# Patient Record
Sex: MALE | Race: WHITE | Employment: PART TIME | URBAN - METROPOLITAN AREA
[De-identification: names, ages, dates, MRNs, and addresses within clinical notes are randomized per-mention and may not be internally consistent; named-entity substitution may affect disease eponyms.]

---

## 2021-08-06 ENCOUNTER — HOSPITAL ENCOUNTER (EMERGENCY)
Facility: CLINIC | Age: 21
Discharge: HOME | End: 2021-08-06
Attending: NURSE PRACTITIONER | Admitting: NURSE PRACTITIONER
Payer: COMMERCIAL

## 2021-08-06 VITALS
HEART RATE: 79 BPM | RESPIRATION RATE: 16 BRPM | WEIGHT: 149.25 LBS | OXYGEN SATURATION: 96 % | DIASTOLIC BLOOD PRESSURE: 73 MMHG | TEMPERATURE: 98.2 F | SYSTOLIC BLOOD PRESSURE: 115 MMHG

## 2021-08-06 DIAGNOSIS — L08.9 INFECTED SLIVER OF SKIN OF FINGER, INITIAL ENCOUNTER: ICD-10-CM

## 2021-08-06 DIAGNOSIS — S60.459A INFECTED SLIVER OF SKIN OF FINGER, INITIAL ENCOUNTER: ICD-10-CM

## 2021-08-06 PROCEDURE — 99203 OFFICE O/P NEW LOW 30 MIN: CPT | Mod: 25 | Performed by: NURSE PRACTITIONER

## 2021-08-06 PROCEDURE — 10120 INC&RMVL FB SUBQ TISS SMPL: CPT | Performed by: NURSE PRACTITIONER

## 2021-08-06 PROCEDURE — G0463 HOSPITAL OUTPT CLINIC VISIT: HCPCS | Mod: 25 | Performed by: NURSE PRACTITIONER

## 2021-08-06 RX ORDER — CEPHALEXIN 500 MG/1
500 CAPSULE ORAL 4 TIMES DAILY
Qty: 40 CAPSULE | Refills: 0 | Status: SHIPPED | OUTPATIENT
Start: 2021-08-06 | End: 2021-08-16

## 2021-08-07 NOTE — DISCHARGE INSTRUCTIONS
Soak the finger twice daily in Epsom salts for 5 to 10 minutes for approximately 5 to 10 days.  Apply a Band-Aid to cover the wound as long as the wound is draining.  When the wound dries out and stops draining you no longer would need to use a Band-Aid.  Start Keflex and take this 4 times daily for 10 days.  Follow-up with your primary care provider in 3 to 5 days if there is no improvement.

## 2021-08-07 NOTE — ED PROVIDER NOTES
History   No chief complaint on file.    HPI  Sd Obrien is a 21 year old male who presents with concerns of infected sliver in right thumb.  Pt reports sustained a wood sliver injury to right volar aspect of thumb.  Pt reports he was only able to remove part of the sliver and there is a portion remaining.  Pt states currently he is experiencing pain, swelling, redness and there is yellow white pustular drainage noted.  Onset of these symptoms were today.    Pt reports blood sugars have been stable and his AIC has been high.    Patient denies fever, cough, sore throat, nasal congestion/runny nose, vomiting, diarrhea, chest congestion, wheezing, myalgias, decreased appetite, decreased activity and hematuria, bright red rectal bleeding,  thoughts of harming self.    Allergies:  No Known Allergies    Problem List:    Patient Active Problem List    Diagnosis Date Noted     Insulin pump status 03/02/2016     Priority: Medium     Formatting of this note might be different from the original.  Basal insulin replacement for pump failure, 19.5 units (3/19/2021)       Type 1 diabetes mellitus without complication (H) 01/11/2016     Priority: Medium        Past Medical History:    No past medical history on file.    Past Surgical History:    No past surgical history on file.    Family History:    No family history on file.    Social History:  Marital Status:  Single [1]  Social History     Tobacco Use     Smoking status: Passive Smoke Exposure - Never Smoker     Tobacco comment: Quit smoking: dad smokes outside   Substance Use Topics     Alcohol use: Not on file     Drug use: Not on file        Medications:    blood glucose (NO BRAND SPECIFIED) test strip  cephALEXin (KEFLEX) 500 MG capsule  insulin lispro (HUMALOG) 100 UNIT/ML vial  CONTOUR NEXT TEST test strip      Review of Systems  As mentioned above in the history present illness. All other systems were reviewed and are negative.    Physical Exam   BP: 115/73  Pulse:  79  Temp: 98.2  F (36.8  C)  Resp: 16  Weight: 67.7 kg (149 lb 4 oz)  SpO2: 96 %      Physical Exam  Vitals and nursing note reviewed.   Constitutional:       General: He is not in acute distress.     Appearance: He is well-developed. He is not toxic-appearing or diaphoretic.   HENT:      Head: Normocephalic and atraumatic.      Right Ear: External ear normal.      Left Ear: External ear normal.      Nose: Nose normal.   Eyes:      General:         Right eye: No discharge.         Left eye: No discharge.      Conjunctiva/sclera: Conjunctivae normal.   Cardiovascular:      Rate and Rhythm: Normal rate and regular rhythm.      Heart sounds: Normal heart sounds. No murmur heard.   No friction rub. No gallop.    Pulmonary:      Effort: Pulmonary effort is normal.      Breath sounds: Normal breath sounds. No stridor. No wheezing.   Musculoskeletal:      Right hand: Swelling and tenderness present. No deformity or bony tenderness. Normal range of motion. Normal strength. Normal sensation. There is no disruption of two-point discrimination. Normal capillary refill. Normal pulse.   Skin:     General: Skin is warm.      Findings: No rash.   Neurological:      Mental Status: He is alert and oriented to person, place, and time.         ED [facility]    Foreign Body Removal    Date/Time: 8/6/2021 7:00 PM  Performed by: Yareli Levine APRN CNP  Authorized by: Yareli Levine APRN CNP       LOCATION     Location:  Finger    Finger location:  R thumb    Depth:  Intradermal    Tendon involvement:  None      PRE-PROCEDURE DETAILS     Imaging:  None    Neurovascular status: intact    ANESTHESIA (see MAR for exact dosages)     Anesthesia method:  Local infiltration and nerve block    Local anesthetic:  Lidocaine 1% w/o epi    Block location:  Right thumb    Block needle gauge:  27 G    Block technique:  Digital    Block injection procedure:  Anatomic landmarks identified, introduced  needle, negative aspiration for blood and incremental injection    Block outcome:  Anesthesia achieved      PROCEDURE TYPE     Procedure complexity:  Simple      PROCEDURE DETAILS     Scalpel size:  11    Incision length:  3 mm    Localization method:  Visualized    Dissection of underlying tissues: no      Bloodless field: no      Removal mechanism:  Forceps and irrigation    Foreign bodies recovered:  2    Description:  Black sliver    Intact foreign body removal: yes      POST-PROCEDURE DETAILS     Neurovascular status: intact      Confirmation:  No additional foreign bodies on visualization    Skin closure:  None    Dressing:  Antibiotic ointment    Patient tolerance of procedure:  Patient tolerated the procedure well with no immediate complications      PROCEDURE   Patient Tolerance:  Patient tolerated the procedure well with no immediate complications          No results found for this or any previous visit (from the past 24 hour(s)).    Medications - No data to display    Assessments & Plan (with Medical Decision Making)  Sd Obrien is a 21 year old male who presents with concerns of infected sliver in right thumb.  Patient reports sustaining sliver 2 to 3 days ago and attempted to remove the sliver by himself but was has been unable.  Patient reports over the last 24 hours increasing pain, redness, and swelling of the finger without purulent drainage.  Patient is type I diabetic and does admit to high hemoglobin A1c is but reports his blood sugars have been stable presently.  On exam patient has an erythematous right thumb with a wound/scab that is sealed over.  Incision and drainage completed and a sliver was removed without complication.  With concern of infection patient placed on Keflex 500 mg p.o. 4 times daily.  Encourage patient to soak in Epsom salts twice daily and encourage follow-up for a wound check and discussed worrisome reasons to return.  Patient discharged in stable condition.     I have  reviewed the nursing notes.    I have reviewed the findings, diagnosis, plan and need for follow up with the patient.    Discharge Medication List as of 8/6/2021  8:14 PM      START taking these medications    Details   cephALEXin (KEFLEX) 500 MG capsule Take 1 capsule (500 mg) by mouth 4 times daily for 10 days, Disp-40 capsule, R-0, E-Prescribe             Final diagnoses:   Infected sliver of skin of finger, initial encounter - right thumb       8/6/2021   [facility] EMERGENCY DEPT     Yareli Levine, ALLISON CNP  08/06/21 2051

## 2022-01-15 ENCOUNTER — APPOINTMENT (OUTPATIENT)
Dept: GENERAL RADIOLOGY | Facility: OTHER | Age: 22
DRG: 638 | End: 2022-01-15
Attending: STUDENT IN AN ORGANIZED HEALTH CARE EDUCATION/TRAINING PROGRAM
Payer: COMMERCIAL

## 2022-01-15 ENCOUNTER — HOSPITAL ENCOUNTER (INPATIENT)
Facility: OTHER | Age: 22
LOS: 1 days | Discharge: HOME | DRG: 638 | End: 2022-01-17
Attending: STUDENT IN AN ORGANIZED HEALTH CARE EDUCATION/TRAINING PROGRAM | Admitting: FAMILY MEDICINE
Payer: COMMERCIAL

## 2022-01-15 DIAGNOSIS — Z11.52 ENCOUNTER FOR SCREENING LABORATORY TESTING FOR COVID-19 VIRUS: ICD-10-CM

## 2022-01-15 DIAGNOSIS — E10.10 DIABETIC KETOACIDOSIS WITHOUT COMA ASSOCIATED WITH TYPE 1 DIABETES MELLITUS (H): ICD-10-CM

## 2022-01-15 DIAGNOSIS — E10.9 TYPE 1 DIABETES MELLITUS WITHOUT COMPLICATION (H): Primary | ICD-10-CM

## 2022-01-15 DIAGNOSIS — Z96.41 INSULIN PUMP STATUS: ICD-10-CM

## 2022-01-15 LAB
ALBUMIN UR-MCNC: 50 MG/DL
ANION GAP SERPL CALCULATED.3IONS-SCNC: 25 MMOL/L (ref 3–14)
ANION GAP SERPL CALCULATED.3IONS-SCNC: 29 MMOL/L (ref 3–14)
APPEARANCE UR: CLEAR
B-OH-BUTYR SERPL-MCNC: 6.1 MMOL/L (ref 0–0.6)
BASE EXCESS BLDA CALC-SCNC: -23.6 MMOL/L (ref -9–1.8)
BASE EXCESS BLDA CALC-SCNC: -24.9 MMOL/L (ref -9–1.8)
BASOPHILS # BLD AUTO: 0.1 10E3/UL (ref 0–0.2)
BASOPHILS # BLD AUTO: 0.1 10E3/UL (ref 0–0.2)
BASOPHILS NFR BLD AUTO: 0 %
BASOPHILS NFR BLD AUTO: 1 %
BILIRUB UR QL STRIP: NEGATIVE
BUN SERPL-MCNC: 24 MG/DL (ref 7–25)
BUN SERPL-MCNC: 26 MG/DL (ref 7–25)
CALCIUM SERPL-MCNC: 10.2 MG/DL (ref 8.6–10.3)
CALCIUM SERPL-MCNC: 9.3 MG/DL (ref 8.6–10.3)
CHLORIDE BLD-SCNC: 104 MMOL/L (ref 98–107)
CHLORIDE BLD-SCNC: 93 MMOL/L (ref 98–107)
CO2 SERPL-SCNC: 6 MMOL/L (ref 21–31)
CO2 SERPL-SCNC: 6 MMOL/L (ref 21–31)
COLOR UR AUTO: YELLOW
CREAT SERPL-MCNC: 1.14 MG/DL (ref 0.7–1.3)
CREAT SERPL-MCNC: 1.39 MG/DL (ref 0.7–1.3)
EOSINOPHIL # BLD AUTO: 0 10E3/UL (ref 0–0.7)
EOSINOPHIL # BLD AUTO: 0 10E3/UL (ref 0–0.7)
EOSINOPHIL NFR BLD AUTO: 0 %
EOSINOPHIL NFR BLD AUTO: 0 %
ERYTHROCYTE [DISTWIDTH] IN BLOOD BY AUTOMATED COUNT: 11.4 % (ref 10–15)
ERYTHROCYTE [DISTWIDTH] IN BLOOD BY AUTOMATED COUNT: 11.5 % (ref 10–15)
FLUAV RNA SPEC QL NAA+PROBE: NEGATIVE
FLUBV RNA RESP QL NAA+PROBE: NEGATIVE
GFR SERPL CREATININE-BSD FRML MDRD: 74 ML/MIN/1.73M2
GFR SERPL CREATININE-BSD FRML MDRD: >90 ML/MIN/1.73M2
GLUCOSE BLD-MCNC: 273 MG/DL (ref 70–105)
GLUCOSE BLD-MCNC: 416 MG/DL (ref 70–105)
GLUCOSE UR STRIP-MCNC: >1000 MG/DL
HBA1C MFR BLD: 11.8 % (ref 4–6.2)
HCO3 BLD-SCNC: 4 MMOL/L (ref 21–28)
HCO3 BLD-SCNC: 5 MMOL/L (ref 21–28)
HCT VFR BLD AUTO: 44.9 % (ref 40–53)
HCT VFR BLD AUTO: 49.2 % (ref 40–53)
HGB BLD-MCNC: 15 G/DL (ref 13.3–17.7)
HGB BLD-MCNC: 16.5 G/DL (ref 13.3–17.7)
HGB UR QL STRIP: ABNORMAL
IMM GRANULOCYTES # BLD: 0.3 10E3/UL
IMM GRANULOCYTES # BLD: 0.4 10E3/UL
IMM GRANULOCYTES NFR BLD: 1 %
IMM GRANULOCYTES NFR BLD: 1 %
KETONES UR STRIP-MCNC: >150 MG/DL
LACTATE SERPL-SCNC: 2.6 MMOL/L (ref 0.7–2)
LEUKOCYTE ESTERASE UR QL STRIP: NEGATIVE
LYMPHOCYTES # BLD AUTO: 1.7 10E3/UL (ref 0.8–5.3)
LYMPHOCYTES # BLD AUTO: 2.3 10E3/UL (ref 0.8–5.3)
LYMPHOCYTES NFR BLD AUTO: 6 %
LYMPHOCYTES NFR BLD AUTO: 9 %
MAGNESIUM SERPL-MCNC: 2.2 MG/DL (ref 1.9–2.7)
MCH RBC QN AUTO: 31.1 PG (ref 26.5–33)
MCH RBC QN AUTO: 31.1 PG (ref 26.5–33)
MCHC RBC AUTO-ENTMCNC: 33.4 G/DL (ref 31.5–36.5)
MCHC RBC AUTO-ENTMCNC: 33.5 G/DL (ref 31.5–36.5)
MCV RBC AUTO: 93 FL (ref 78–100)
MCV RBC AUTO: 93 FL (ref 78–100)
MONOCYTES # BLD AUTO: 1.5 10E3/UL (ref 0–1.3)
MONOCYTES # BLD AUTO: 1.7 10E3/UL (ref 0–1.3)
MONOCYTES NFR BLD AUTO: 6 %
MONOCYTES NFR BLD AUTO: 7 %
NEUTROPHILS # BLD AUTO: 21.6 10E3/UL (ref 1.6–8.3)
NEUTROPHILS # BLD AUTO: 23.6 10E3/UL (ref 1.6–8.3)
NEUTROPHILS NFR BLD AUTO: 83 %
NEUTROPHILS NFR BLD AUTO: 86 %
NITRATE UR QL: NEGATIVE
NRBC # BLD AUTO: 0 10E3/UL
NRBC # BLD AUTO: 0 10E3/UL
NRBC BLD AUTO-RTO: 0 /100
NRBC BLD AUTO-RTO: 0 /100
O2/TOTAL GAS SETTING VFR VENT: 0 %
O2/TOTAL GAS SETTING VFR VENT: 0 %
OXYHGB MFR BLD: 97 % (ref 92–100)
OXYHGB MFR BLD: 97 % (ref 92–100)
PCO2 BLD: 15 MM HG (ref 35–45)
PCO2 BLD: 16 MM HG (ref 35–45)
PH BLD: 7.03 [PH] (ref 7.35–7.45)
PH BLD: 7.07 [PH] (ref 7.35–7.45)
PH UR STRIP: 5 [PH] (ref 5–9)
PHOSPHATE SERPL-MCNC: 6.2 MG/DL (ref 2.5–5)
PLATELET # BLD AUTO: 306 10E3/UL (ref 150–450)
PLATELET # BLD AUTO: 400 10E3/UL (ref 150–450)
PO2 BLD: 116 MM HG (ref 80–105)
PO2 BLD: 118 MM HG (ref 80–105)
POTASSIUM BLD-SCNC: 5.3 MMOL/L (ref 3.5–5.1)
POTASSIUM BLD-SCNC: 7.1 MMOL/L (ref 3.5–5.1)
PROCALCITONIN SERPL-MCNC: <0.5 NG/ML
RBC # BLD AUTO: 4.83 10E6/UL (ref 4.4–5.9)
RBC # BLD AUTO: 5.31 10E6/UL (ref 4.4–5.9)
RBC URINE: <1 /HPF
RSV RNA SPEC NAA+PROBE: NEGATIVE
SARS-COV-2 RNA RESP QL NAA+PROBE: NEGATIVE
SODIUM SERPL-SCNC: 128 MMOL/L (ref 134–144)
SODIUM SERPL-SCNC: 135 MMOL/L (ref 134–144)
SP GR UR STRIP: 1.02 (ref 1–1.03)
UROBILINOGEN UR STRIP-MCNC: NORMAL MG/DL
WBC # BLD AUTO: 26 10E3/UL (ref 4–11)
WBC # BLD AUTO: 27.3 10E3/UL (ref 4–11)
WBC URINE: <1 /HPF

## 2022-01-15 PROCEDURE — 84100 ASSAY OF PHOSPHORUS: CPT | Performed by: STUDENT IN AN ORGANIZED HEALTH CARE EDUCATION/TRAINING PROGRAM

## 2022-01-15 PROCEDURE — 36416 COLLJ CAPILLARY BLOOD SPEC: CPT | Performed by: STUDENT IN AN ORGANIZED HEALTH CARE EDUCATION/TRAINING PROGRAM

## 2022-01-15 PROCEDURE — 258N000001 HC RX 258: Performed by: STUDENT IN AN ORGANIZED HEALTH CARE EDUCATION/TRAINING PROGRAM

## 2022-01-15 PROCEDURE — 93005 ELECTROCARDIOGRAM TRACING: CPT | Performed by: STUDENT IN AN ORGANIZED HEALTH CARE EDUCATION/TRAINING PROGRAM

## 2022-01-15 PROCEDURE — 250N000012 HC RX MED GY IP 250 OP 636 PS 637: Performed by: STUDENT IN AN ORGANIZED HEALTH CARE EDUCATION/TRAINING PROGRAM

## 2022-01-15 PROCEDURE — 96366 THER/PROPH/DIAG IV INF ADDON: CPT | Performed by: STUDENT IN AN ORGANIZED HEALTH CARE EDUCATION/TRAINING PROGRAM

## 2022-01-15 PROCEDURE — 36600 WITHDRAWAL OF ARTERIAL BLOOD: CPT | Performed by: STUDENT IN AN ORGANIZED HEALTH CARE EDUCATION/TRAINING PROGRAM

## 2022-01-15 PROCEDURE — 82010 KETONE BODYS QUAN: CPT | Performed by: STUDENT IN AN ORGANIZED HEALTH CARE EDUCATION/TRAINING PROGRAM

## 2022-01-15 PROCEDURE — 99285 EMERGENCY DEPT VISIT HI MDM: CPT | Mod: 25 | Performed by: STUDENT IN AN ORGANIZED HEALTH CARE EDUCATION/TRAINING PROGRAM

## 2022-01-15 PROCEDURE — 83735 ASSAY OF MAGNESIUM: CPT | Performed by: STUDENT IN AN ORGANIZED HEALTH CARE EDUCATION/TRAINING PROGRAM

## 2022-01-15 PROCEDURE — 87637 SARSCOV2&INF A&B&RSV AMP PRB: CPT | Performed by: STUDENT IN AN ORGANIZED HEALTH CARE EDUCATION/TRAINING PROGRAM

## 2022-01-15 PROCEDURE — 96368 THER/DIAG CONCURRENT INF: CPT | Performed by: STUDENT IN AN ORGANIZED HEALTH CARE EDUCATION/TRAINING PROGRAM

## 2022-01-15 PROCEDURE — 99285 EMERGENCY DEPT VISIT HI MDM: CPT | Performed by: STUDENT IN AN ORGANIZED HEALTH CARE EDUCATION/TRAINING PROGRAM

## 2022-01-15 PROCEDURE — 84145 PROCALCITONIN (PCT): CPT | Performed by: STUDENT IN AN ORGANIZED HEALTH CARE EDUCATION/TRAINING PROGRAM

## 2022-01-15 PROCEDURE — 83036 HEMOGLOBIN GLYCOSYLATED A1C: CPT | Performed by: STUDENT IN AN ORGANIZED HEALTH CARE EDUCATION/TRAINING PROGRAM

## 2022-01-15 PROCEDURE — 93010 ELECTROCARDIOGRAM REPORT: CPT | Performed by: INTERNAL MEDICINE

## 2022-01-15 PROCEDURE — 250N000011 HC RX IP 250 OP 636: Performed by: STUDENT IN AN ORGANIZED HEALTH CARE EDUCATION/TRAINING PROGRAM

## 2022-01-15 PROCEDURE — 80048 BASIC METABOLIC PNL TOTAL CA: CPT | Performed by: STUDENT IN AN ORGANIZED HEALTH CARE EDUCATION/TRAINING PROGRAM

## 2022-01-15 PROCEDURE — 83605 ASSAY OF LACTIC ACID: CPT | Performed by: STUDENT IN AN ORGANIZED HEALTH CARE EDUCATION/TRAINING PROGRAM

## 2022-01-15 PROCEDURE — 36415 COLL VENOUS BLD VENIPUNCTURE: CPT | Performed by: STUDENT IN AN ORGANIZED HEALTH CARE EDUCATION/TRAINING PROGRAM

## 2022-01-15 PROCEDURE — 81001 URINALYSIS AUTO W/SCOPE: CPT | Performed by: STUDENT IN AN ORGANIZED HEALTH CARE EDUCATION/TRAINING PROGRAM

## 2022-01-15 PROCEDURE — C9803 HOPD COVID-19 SPEC COLLECT: HCPCS | Performed by: STUDENT IN AN ORGANIZED HEALTH CARE EDUCATION/TRAINING PROGRAM

## 2022-01-15 PROCEDURE — 82805 BLOOD GASES W/O2 SATURATION: CPT | Performed by: STUDENT IN AN ORGANIZED HEALTH CARE EDUCATION/TRAINING PROGRAM

## 2022-01-15 PROCEDURE — 96375 TX/PRO/DX INJ NEW DRUG ADDON: CPT | Performed by: STUDENT IN AN ORGANIZED HEALTH CARE EDUCATION/TRAINING PROGRAM

## 2022-01-15 PROCEDURE — 85025 COMPLETE CBC W/AUTO DIFF WBC: CPT | Performed by: STUDENT IN AN ORGANIZED HEALTH CARE EDUCATION/TRAINING PROGRAM

## 2022-01-15 PROCEDURE — 258N000003 HC RX IP 258 OP 636: Performed by: STUDENT IN AN ORGANIZED HEALTH CARE EDUCATION/TRAINING PROGRAM

## 2022-01-15 PROCEDURE — 96365 THER/PROPH/DIAG IV INF INIT: CPT | Performed by: STUDENT IN AN ORGANIZED HEALTH CARE EDUCATION/TRAINING PROGRAM

## 2022-01-15 PROCEDURE — 71046 X-RAY EXAM CHEST 2 VIEWS: CPT | Mod: TC

## 2022-01-15 PROCEDURE — 250N000009 HC RX 250: Performed by: STUDENT IN AN ORGANIZED HEALTH CARE EDUCATION/TRAINING PROGRAM

## 2022-01-15 RX ORDER — DEXTROSE MONOHYDRATE 25 G/50ML
25-50 INJECTION, SOLUTION INTRAVENOUS
Status: DISCONTINUED | OUTPATIENT
Start: 2022-01-15 | End: 2022-01-16

## 2022-01-15 RX ORDER — CALCIUM GLUCONATE 94 MG/ML
1 INJECTION, SOLUTION INTRAVENOUS ONCE
Status: COMPLETED | OUTPATIENT
Start: 2022-01-15 | End: 2022-01-15

## 2022-01-15 RX ORDER — KETOROLAC TROMETHAMINE 15 MG/ML
15 INJECTION, SOLUTION INTRAMUSCULAR; INTRAVENOUS ONCE
Status: COMPLETED | OUTPATIENT
Start: 2022-01-15 | End: 2022-01-15

## 2022-01-15 RX ORDER — ONDANSETRON 2 MG/ML
4 INJECTION INTRAMUSCULAR; INTRAVENOUS EVERY 30 MIN PRN
Status: DISCONTINUED | OUTPATIENT
Start: 2022-01-15 | End: 2022-01-16

## 2022-01-15 RX ADMIN — INSULIN HUMAN 7 UNITS: 100 INJECTION, SOLUTION PARENTERAL at 21:50

## 2022-01-15 RX ADMIN — Medication 5 UNITS/HR: at 21:42

## 2022-01-15 RX ADMIN — SODIUM CHLORIDE 1000 ML: 9 INJECTION, SOLUTION INTRAVENOUS at 20:17

## 2022-01-15 RX ADMIN — KETOROLAC TROMETHAMINE 15 MG: 15 INJECTION, SOLUTION INTRAMUSCULAR; INTRAVENOUS at 20:26

## 2022-01-15 RX ADMIN — ONDANSETRON 4 MG: 2 INJECTION INTRAMUSCULAR; INTRAVENOUS at 20:18

## 2022-01-15 RX ADMIN — SODIUM CHLORIDE 1000 ML: 9 INJECTION, SOLUTION INTRAVENOUS at 21:17

## 2022-01-15 RX ADMIN — CALCIUM GLUCONATE 1 G: 98 INJECTION, SOLUTION INTRAVENOUS at 21:29

## 2022-01-15 RX ADMIN — DEXTROSE MONOHYDRATE AND SODIUM CHLORIDE 1000 ML: 5; .45 INJECTION, SOLUTION INTRAVENOUS at 23:08

## 2022-01-15 ASSESSMENT — MIFFLIN-ST. JEOR: SCORE: 1662.25

## 2022-01-16 PROBLEM — E10.10 DIABETIC KETOACIDOSIS WITHOUT COMA ASSOCIATED WITH TYPE 1 DIABETES MELLITUS (H): Status: ACTIVE | Noted: 2022-01-16

## 2022-01-16 PROBLEM — R65.10 SIRS (SYSTEMIC INFLAMMATORY RESPONSE SYNDROME) (H): Status: ACTIVE | Noted: 2022-01-16

## 2022-01-16 PROBLEM — E87.1 HYPONATREMIA: Status: ACTIVE | Noted: 2022-01-16

## 2022-01-16 LAB
ANION GAP SERPL CALCULATED.3IONS-SCNC: 10 MMOL/L (ref 3–14)
ANION GAP SERPL CALCULATED.3IONS-SCNC: 14 MMOL/L (ref 3–14)
ANION GAP SERPL CALCULATED.3IONS-SCNC: 19 MMOL/L (ref 3–14)
ATRIAL RATE - MUSE: 94 BPM
B-OH-BUTYR SERPL-MCNC: 0.7 MMOL/L (ref 0–0.6)
BASE EXCESS BLDV CALC-SCNC: -13.5 MMOL/L (ref -7.7–1.9)
BASE EXCESS BLDV CALC-SCNC: -19.2 MMOL/L (ref -7.7–1.9)
BASE EXCESS BLDV CALC-SCNC: -7.2 MMOL/L (ref -7.7–1.9)
BASOPHILS # BLD AUTO: 0 10E3/UL (ref 0–0.2)
BASOPHILS NFR BLD AUTO: 0 %
BUN SERPL-MCNC: 17 MG/DL (ref 7–25)
BUN SERPL-MCNC: 17 MG/DL (ref 7–25)
BUN SERPL-MCNC: 20 MG/DL (ref 7–25)
CALCIUM SERPL-MCNC: 8.9 MG/DL (ref 8.6–10.3)
CALCIUM SERPL-MCNC: 9 MG/DL (ref 8.6–10.3)
CALCIUM SERPL-MCNC: 9.1 MG/DL (ref 8.6–10.3)
CHLORIDE BLD-SCNC: 102 MMOL/L (ref 98–107)
CHLORIDE BLD-SCNC: 103 MMOL/L (ref 98–107)
CHLORIDE BLD-SCNC: 103 MMOL/L (ref 98–107)
CO2 SERPL-SCNC: 12 MMOL/L (ref 21–31)
CO2 SERPL-SCNC: 16 MMOL/L (ref 21–31)
CO2 SERPL-SCNC: 8 MMOL/L (ref 21–31)
CREAT SERPL-MCNC: 1.04 MG/DL (ref 0.7–1.3)
CREAT SERPL-MCNC: 1.05 MG/DL (ref 0.7–1.3)
CREAT SERPL-MCNC: 1.28 MG/DL (ref 0.7–1.3)
DIASTOLIC BLOOD PRESSURE - MUSE: NORMAL MMHG
EOSINOPHIL # BLD AUTO: 0 10E3/UL (ref 0–0.7)
EOSINOPHIL NFR BLD AUTO: 0 %
ERYTHROCYTE [DISTWIDTH] IN BLOOD BY AUTOMATED COUNT: 11.4 % (ref 10–15)
GFR SERPL CREATININE-BSD FRML MDRD: 82 ML/MIN/1.73M2
GFR SERPL CREATININE-BSD FRML MDRD: >90 ML/MIN/1.73M2
GFR SERPL CREATININE-BSD FRML MDRD: >90 ML/MIN/1.73M2
GLUCOSE BLD-MCNC: 223 MG/DL (ref 70–105)
GLUCOSE BLD-MCNC: 242 MG/DL (ref 70–105)
GLUCOSE BLD-MCNC: 320 MG/DL (ref 70–105)
HCO3 BLDV-SCNC: 12 MMOL/L (ref 21–28)
HCO3 BLDV-SCNC: 17 MMOL/L (ref 21–28)
HCO3 BLDV-SCNC: 8 MMOL/L (ref 21–28)
HCT VFR BLD AUTO: 40 % (ref 40–53)
HGB BLD-MCNC: 13.8 G/DL (ref 13.3–17.7)
IMM GRANULOCYTES # BLD: 0.2 10E3/UL
IMM GRANULOCYTES NFR BLD: 1 %
INTERPRETATION ECG - MUSE: NORMAL
LACTATE SERPL-SCNC: 1 MMOL/L (ref 0.7–2)
LYMPHOCYTES # BLD AUTO: 2.1 10E3/UL (ref 0.8–5.3)
LYMPHOCYTES NFR BLD AUTO: 11 %
MCH RBC QN AUTO: 31.2 PG (ref 26.5–33)
MCHC RBC AUTO-ENTMCNC: 34.5 G/DL (ref 31.5–36.5)
MCV RBC AUTO: 91 FL (ref 78–100)
MONOCYTES # BLD AUTO: 1.5 10E3/UL (ref 0–1.3)
MONOCYTES NFR BLD AUTO: 8 %
NEUTROPHILS # BLD AUTO: 15 10E3/UL (ref 1.6–8.3)
NEUTROPHILS NFR BLD AUTO: 80 %
NRBC # BLD AUTO: 0 10E3/UL
NRBC BLD AUTO-RTO: 0 /100
O2/TOTAL GAS SETTING VFR VENT: 0 %
OXYHGB MFR BLDV: 95 % (ref 70–75)
OXYHGB MFR BLDV: 95 % (ref 70–75)
OXYHGB MFR BLDV: 97 % (ref 70–75)
P AXIS - MUSE: 67 DEGREES
PCO2 BLDV: 21 MM HG (ref 40–50)
PCO2 BLDV: 27 MM HG (ref 40–50)
PCO2 BLDV: 31 MM HG (ref 40–50)
PH BLDV: 7.16 [PH] (ref 7.32–7.43)
PH BLDV: 7.25 [PH] (ref 7.32–7.43)
PH BLDV: 7.35 [PH] (ref 7.32–7.43)
PLATELET # BLD AUTO: 323 10E3/UL (ref 150–450)
PO2 BLDV: 108 MM HG (ref 25–47)
PO2 BLDV: 80 MM HG (ref 25–47)
PO2 BLDV: 84 MM HG (ref 25–47)
POTASSIUM BLD-SCNC: 4.1 MMOL/L (ref 3.5–5.1)
POTASSIUM BLD-SCNC: 4.5 MMOL/L (ref 3.5–5.1)
POTASSIUM BLD-SCNC: 5.2 MMOL/L (ref 3.5–5.1)
PR INTERVAL - MUSE: 166 MS
QRS DURATION - MUSE: 86 MS
QT - MUSE: 358 MS
QTC - MUSE: 447 MS
R AXIS - MUSE: 80 DEGREES
RBC # BLD AUTO: 4.42 10E6/UL (ref 4.4–5.9)
SODIUM SERPL-SCNC: 128 MMOL/L (ref 134–144)
SODIUM SERPL-SCNC: 129 MMOL/L (ref 134–144)
SODIUM SERPL-SCNC: 130 MMOL/L (ref 134–144)
SYSTOLIC BLOOD PRESSURE - MUSE: NORMAL MMHG
T AXIS - MUSE: 59 DEGREES
VENTRICULAR RATE- MUSE: 94 BPM
WBC # BLD AUTO: 18.7 10E3/UL (ref 4–11)

## 2022-01-16 PROCEDURE — 258N000003 HC RX IP 258 OP 636: Performed by: FAMILY MEDICINE

## 2022-01-16 PROCEDURE — 82374 ASSAY BLOOD CARBON DIOXIDE: CPT | Performed by: STUDENT IN AN ORGANIZED HEALTH CARE EDUCATION/TRAINING PROGRAM

## 2022-01-16 PROCEDURE — 99222 1ST HOSP IP/OBS MODERATE 55: CPT | Performed by: FAMILY MEDICINE

## 2022-01-16 PROCEDURE — 82310 ASSAY OF CALCIUM: CPT | Performed by: STUDENT IN AN ORGANIZED HEALTH CARE EDUCATION/TRAINING PROGRAM

## 2022-01-16 PROCEDURE — 96372 THER/PROPH/DIAG INJ SC/IM: CPT | Performed by: STUDENT IN AN ORGANIZED HEALTH CARE EDUCATION/TRAINING PROGRAM

## 2022-01-16 PROCEDURE — 82805 BLOOD GASES W/O2 SATURATION: CPT | Performed by: STUDENT IN AN ORGANIZED HEALTH CARE EDUCATION/TRAINING PROGRAM

## 2022-01-16 PROCEDURE — 85025 COMPLETE CBC W/AUTO DIFF WBC: CPT | Performed by: STUDENT IN AN ORGANIZED HEALTH CARE EDUCATION/TRAINING PROGRAM

## 2022-01-16 PROCEDURE — 258N000001 HC RX 258: Performed by: STUDENT IN AN ORGANIZED HEALTH CARE EDUCATION/TRAINING PROGRAM

## 2022-01-16 PROCEDURE — 82010 KETONE BODYS QUAN: CPT | Performed by: FAMILY MEDICINE

## 2022-01-16 PROCEDURE — 83605 ASSAY OF LACTIC ACID: CPT | Performed by: FAMILY MEDICINE

## 2022-01-16 PROCEDURE — 120N000001 HC R&B MED SURG/OB

## 2022-01-16 PROCEDURE — 36415 COLL VENOUS BLD VENIPUNCTURE: CPT | Performed by: STUDENT IN AN ORGANIZED HEALTH CARE EDUCATION/TRAINING PROGRAM

## 2022-01-16 PROCEDURE — 36415 COLL VENOUS BLD VENIPUNCTURE: CPT | Performed by: FAMILY MEDICINE

## 2022-01-16 PROCEDURE — 250N000012 HC RX MED GY IP 250 OP 636 PS 637: Performed by: STUDENT IN AN ORGANIZED HEALTH CARE EDUCATION/TRAINING PROGRAM

## 2022-01-16 PROCEDURE — 250N000012 HC RX MED GY IP 250 OP 636 PS 637: Performed by: FAMILY MEDICINE

## 2022-01-16 RX ORDER — ACETAMINOPHEN 650 MG/1
650 SUPPOSITORY RECTAL EVERY 6 HOURS PRN
Status: DISCONTINUED | OUTPATIENT
Start: 2022-01-16 | End: 2022-01-17 | Stop reason: HOSPADM

## 2022-01-16 RX ORDER — ONDANSETRON 4 MG/1
4 TABLET, ORALLY DISINTEGRATING ORAL EVERY 6 HOURS PRN
Status: DISCONTINUED | OUTPATIENT
Start: 2022-01-16 | End: 2022-01-17 | Stop reason: HOSPADM

## 2022-01-16 RX ORDER — NICOTINE POLACRILEX 4 MG
15-30 LOZENGE BUCCAL
Status: DISCONTINUED | OUTPATIENT
Start: 2022-01-16 | End: 2022-01-17 | Stop reason: HOSPADM

## 2022-01-16 RX ORDER — DEXTROSE MONOHYDRATE 25 G/50ML
25-50 INJECTION, SOLUTION INTRAVENOUS
Status: DISCONTINUED | OUTPATIENT
Start: 2022-01-16 | End: 2022-01-17 | Stop reason: HOSPADM

## 2022-01-16 RX ORDER — ACETAMINOPHEN 325 MG/1
650 TABLET ORAL EVERY 6 HOURS PRN
Status: DISCONTINUED | OUTPATIENT
Start: 2022-01-16 | End: 2022-01-17 | Stop reason: HOSPADM

## 2022-01-16 RX ORDER — SODIUM CHLORIDE 9 MG/ML
INJECTION, SOLUTION INTRAVENOUS CONTINUOUS
Status: DISCONTINUED | OUTPATIENT
Start: 2022-01-16 | End: 2022-01-17 | Stop reason: HOSPADM

## 2022-01-16 RX ORDER — LIDOCAINE 40 MG/G
CREAM TOPICAL
Status: DISCONTINUED | OUTPATIENT
Start: 2022-01-16 | End: 2022-01-17 | Stop reason: HOSPADM

## 2022-01-16 RX ORDER — ONDANSETRON 2 MG/ML
4 INJECTION INTRAMUSCULAR; INTRAVENOUS EVERY 6 HOURS PRN
Status: DISCONTINUED | OUTPATIENT
Start: 2022-01-16 | End: 2022-01-17 | Stop reason: HOSPADM

## 2022-01-16 RX ADMIN — SODIUM CHLORIDE: 9 INJECTION, SOLUTION INTRAVENOUS at 11:32

## 2022-01-16 RX ADMIN — DEXTROSE MONOHYDRATE AND SODIUM CHLORIDE: 5; .45 INJECTION, SOLUTION INTRAVENOUS at 07:49

## 2022-01-16 RX ADMIN — INSULIN GLARGINE 20 UNITS: 100 INJECTION, SOLUTION SUBCUTANEOUS at 08:31

## 2022-01-16 RX ADMIN — SODIUM CHLORIDE: 9 INJECTION, SOLUTION INTRAVENOUS at 20:54

## 2022-01-16 RX ADMIN — INSULIN ASPART 5 UNITS: 100 INJECTION, SOLUTION INTRAVENOUS; SUBCUTANEOUS at 12:05

## 2022-01-16 RX ADMIN — DEXTROSE MONOHYDRATE AND SODIUM CHLORIDE 1000 ML: 5; .45 INJECTION, SOLUTION INTRAVENOUS at 02:10

## 2022-01-16 RX ADMIN — INSULIN ASPART 3 UNITS: 100 INJECTION, SOLUTION INTRAVENOUS; SUBCUTANEOUS at 17:21

## 2022-01-16 ASSESSMENT — ACTIVITIES OF DAILY LIVING (ADL)
ADLS_ACUITY_SCORE: 6

## 2022-01-16 ASSESSMENT — MIFFLIN-ST. JEOR: SCORE: 1660.35

## 2022-01-16 NOTE — ASSESSMENT & PLAN NOTE
Likely secondary to his DKA  This a.m. slightly worsened with use of half-normal saline  This time will switch over to normal saline and recheck later today

## 2022-01-16 NOTE — H&P
[facility]    History and Physical - Hospitalist Service       Date of Admission:  1/15/2022    Assessment & Plan      Sd Obrien is a 21 year old male admitted on 1/15/2022. He presents from home to the ED not feeling well for the past day with malaise, poor appetite.  Patient is type I diabetic, normally controlled with use of insulin pump and Dexcom monitor although due to lack of supplies has been using an old [employer] pump without glucose monitoring.  On arrival to the ED his blood sugar was elevated greater than 400 with other chemical indicators showing him to be in DKA with a pH of 7.07, increased anion gap with other electrolyte abnormalities including hyponatremia, hyperkalemia and elevated lactate.  He denies fever but white count was markedly elevated and he met SIRS criteria, but no signs of obvious infectious etiology with procalcitonin being negative.  Due to lack of ICU space both here and at [facility] he has been treated in the emergency department with continuous insulin infusion and has improved overnight, this a.m. at baseline mental status, blood sugar stable with electrolytes improving.  Endocrinology has been consulted at [facility] with recommendation of transitioning from insulin infusion to subcu insulin recommending Lantus 20 units in the a.m. today with 1-12 carb coverage at meals and use of a low sliding scale coverage.  Patient will be admitted to the medical floor with these recommendations.  He has somewhat low sodium and will switch over to normal saline infusion and monitor electrolytes and numbers during the day.  Expect if doing well, can likely go home tomorrow and the plan is for him to  supplies to be able to restart his normal pump and Dexcom monitor.  Diabetic ketoacidosis without coma associated with type 1 diabetes mellitus (H)  21-year-old type I diabetic presenting with malaise, body aches the past day  Using insulin pump although using old  pump without glucose monitoring  On arrival, hyperglycemic with blood sugar force 61 with elevated ketones, elevated anion gap and a blood gas showing pH of 7.07.  Due to lack of ICU bed space and not able to transfer to [facility] where he is normally treated, he has been treated in the ED  Improving after insulin infusion with improvement of electrolytes and acid-base abnormalities  His case was reviewed by ER and endocrine with the recommendation this a.m. of transferring over to 20 units of a.m. Lantus with 1-12 carb coverage with meals and sliding scale coverage.  Patient will be admitted to inpatient on the medical floor, will continue these recommendations, IV fluids  Expect to be in the hospital for the least the next day with the plan for him to restart insulin pump at discharge after he gets the necessary supplies    SIRS (systemic inflammatory response syndrome) (H)  Presenting ill with malaise, no fever with tachycardia, leukocytosis with elevated lactate but normal procalcitonin.  SIRS criteria likely set off by DKA, no clear indication of infectious etiology  This a.m. clinically looks well, white count trending down  He will be admitted for treatment of DKA, hold antibiotic coverage at this point  Has been treated with aggressive IV fluids, eating and drinking normally now    Hyponatremia  Likely secondary to his DKA  This a.m. slightly worsened with use of half-normal saline  This time will switch over to normal saline and recheck later today         Diet:  Moderate carb diet  DVT Prophylaxis: Low Risk/Ambulatory with no VTE prophylaxis indicated  Mena Catheter: Not present  Central Lines: None  Code Status:  Full code    Clinically Significant Risk Factors Present on Admission         # Hyponatremia: Na = 128 mmol/L (Ref range: 134 - 144 mmol/L) on admission, will monitor as appropriate            Disposition Plan   Expected Discharge:  tomorrow  Anticipated discharge location:  Awaiting care  coordination huddle  Delays:  Control blood sugar, improved electrolytes       The patient's care was discussed with the Bedside Nurse, Patient and ER provider.    Mateus Krishnan MD  [facility]  Securely message with the Vocera Web Console (learn more here)  Text page via [facility] Paging/Directory        ______________________________________________________________________    Chief Complaint   21-year-old male with type 1 diabetes presenting with diabetic ketoacidosis    History is obtained from the patient, electronic health record and emergency department physician    History of Present Illness   Sd Obrien is a 21 year old male who presents from home not feeling well for the past 2 days with malaise, poor appetite, nausea.  Patient is type I diabetic who recently has been using an old insulin pump because his newer pump and Dexcom monitor had run out of supplies.  He has not been checking his sugars.  He has had poor appetite with nausea and is cut back on his dosing and did not take insulin on the morning of admission.  On arrival to the ED he was acidotic, hyperglycemic with multiple metabolic abnormalities.  Due to lack of bed space he has been treated in the ER overnight with continuous insulin infusion with his numbers correcting.  This morning he is feeling much better, tolerating diet and will be admitted to the medical floor.  At home he denies fever.  There is been no cough, shortness of breath.  He is COVID immunized with no recent exposure.  He denies diarrhea.    Review of Systems    All other systems reviewed and negative other than noted in the HPI or here.       Past Medical History    I have reviewed this patient's medical history and updated it with pertinent information if needed.   History reviewed. No pertinent past medical history.    Past Surgical History   I have reviewed this patient's surgical history and updated it with pertinent information if  needed.  History reviewed. No pertinent surgical history.    Social History   I have reviewed this patient's social history and updated it with pertinent information if needed.  Social History     Tobacco Use     Smoking status: Passive Smoke Exposure - Never Smoker     Smokeless tobacco: Never Used     Tobacco comment: Quit smoking: dad smokes outside   Substance Use Topics     Alcohol use: Never     Drug use: Never       Family History   No significant family history, including no history of:     Prior to Admission Medications   Prior to Admission Medications   Prescriptions Last Dose Informant Patient Reported? Taking?   CONTOUR NEXT TEST test strip   Yes No   Sig: TESTING 6 TIMES DAILY   blood glucose (NO BRAND SPECIFIED) test strip   Yes No   Si times a day, patient needs these to use with his insulin pump, cannot use another test strip, start PA if necessary.   insulin lispro (HUMALOG) 100 UNIT/ML vial   Yes No   Sig: For use in insulin pump; BR 12A 0.85, 6A 0.80, 11A 0.75,  4P 0.8 10P 0.90  CR: 12A 10 SF 35, TDD - 60 units      Facility-Administered Medications: None     Allergies   No Known Allergies    Physical Exam   Vital Signs: Temp: 97.2  F (36.2  C) Temp src: Tympanic BP: 95/50 Pulse: 95   Resp: 13 SpO2: 98 % O2 Device: None (Room air)    Weight: 143 lbs 8.31 oz    General Appearance: Appropriate age male, sitting in bed eating breakfast, appears to be in no distress  Eyes: Pupils equal, reactive  HEENT: Nose mouth throat unremarkable  Respiratory: Lungs are clear  Cardiovascular: Regular rate and rhythm no murmur heard  GI: Soft, nondistended, nontender  Lymph/Hematologic: Cervical nodes negative  Genitourinary: Not examined  Skin: No lesions or rashes  Musculoskeletal: No abnormalities  Neurologic: No focal changes, cranial nerves normal  Psychiatric: Mood and affect are normal    Data   Data reviewed today: I reviewed all medications, new labs and imaging results over the last 24 hours. I  personally reviewed the chest x-ray image(s) showing Normal, no signs of infiltrate.    Results for orders placed or performed during the hospital encounter of 01/15/22   XR Chest 2 Views     Status: None    Narrative    PROCEDURE INFORMATION:   Exam: XR Chest   Exam date and time: 1/15/2022 11:04 PM   Age: 21 years old   Clinical indication: Shortness of breath; Additional info: ? Occult pna with   wbc 27     TECHNIQUE:   Imaging protocol: XR of the chest.   Views: 2 views.     COMPARISON:   No relevant prior studies available.     FINDINGS:   Lungs: No consolidative infiltrate.   Pleural spaces: No pleural effusion or pneumothorax.   Heart/Mediastinum: Normal heart size.   Bones/joints: No acute finding.       Impression    IMPRESSION:   No radiographic signs of acute process.     THIS DOCUMENT HAS BEEN ELECTRONICALLY SIGNED BY CANDACE MONTOYA MD   Symptomatic; Yes; 1/14/2022 Influenza A/B & SARS-CoV2 (COVID-19) Virus PCR Multiplex Nose     Status: Normal    Specimen: Nose; Swab   Result Value Ref Range    Influenza A PCR Negative Negative    Influenza B PCR Negative Negative    RSV PCR Negative Negative    SARS CoV2 PCR Negative Negative    Narrative    Testing was performed using the Xpert Xpress CoV2/Flu/RSV Assay on the [employer] GeneXpert Instrument. This test should be ordered for the detection of SARS-CoV-2 and influenza viruses in individuals who meet clinical and/or epidemiological criteria. Test performance is unknown in asymptomatic patients. This test is for in vitro diagnostic use under the FDA EUA for laboratories certified under CLIA to perform high or moderate complexity testing. This test has not been FDA cleared or approved. A negative result does not rule out the presence of PCR inhibitors in the specimen or target RNA in concentration below the limit of detection for the assay. If only one viral target is positive but coinfection with multiple targets is suspected, the sample should be re-tested  with another FDA cleared, approved, or authorized test, if coinfection would change clinical management. This test was validated by the [facility] [employer] - is now [employer]. These laboratories are certified under the Clinical  Laboratory Improvement Amendments of 1988 (CLIA-88) as qualified to perform high complexity laboratory testing.   Hemoglobin A1c     Status: Abnormal   Result Value Ref Range    Hemoglobin A1C 11.8 (H) 4.0 - 6.2 %   Basic metabolic panel     Status: Abnormal   Result Value Ref Range    Sodium 128 (L) 134 - 144 mmol/L    Potassium 7.1 (HH) 3.5 - 5.1 mmol/L    Chloride 93 (L) 98 - 107 mmol/L    Carbon Dioxide (CO2) 6 (LL) 21 - 31 mmol/L    Anion Gap 29 (H) 3 - 14 mmol/L    Urea Nitrogen 26 (H) 7 - 25 mg/dL    Creatinine 1.39 (H) 0.70 - 1.30 mg/dL    Calcium 10.2 8.6 - 10.3 mg/dL    Glucose 416 (H) 70 - 105 mg/dL    GFR Estimate 74 >60 mL/min/1.73m2   Blood gas arterial and oxyhgb     Status: Abnormal   Result Value Ref Range    pH Arterial 7.07 (LL) 7.35 - 7.45    pCO2 Arterial 15 (LL) 35 - 45 mm Hg    pO2 Arterial 116 (H) 80 - 105 mm Hg    Bicarbonate Arterial 5 (LL) 21 - 28 mmol/L    Oxyhemoglobin Arterial 97 92 - 100 %    Base Excess/Deficit (+/-) -23.6 (L) -9.0 - 1.8 mmol/L    FIO2 0    Ketone Beta-Hydroxybutyrate Quantitative     Status: Abnormal   Result Value Ref Range    Ketone (Beta-Hydroxybutyrate) Quantitative 6.1 (HH) 0.0 - 0.6 mmol/L   CBC with platelets and differential     Status: Abnormal   Result Value Ref Range    WBC Count 27.3 (H) 4.0 - 11.0 10e3/uL    RBC Count 5.31 4.40 - 5.90 10e6/uL    Hemoglobin 16.5 13.3 - 17.7 g/dL    Hematocrit 49.2 40.0 - 53.0 %    MCV 93 78 - 100 fL    MCH 31.1 26.5 - 33.0 pg    MCHC 33.5 31.5 - 36.5 g/dL    RDW 11.5 10.0 - 15.0 %    Platelet Count 400 150 - 450 10e3/uL    % Neutrophils 86 %    % Lymphocytes 6 %    % Monocytes 6 %    % Eosinophils 0 %    % Basophils 1 %    % Immature Granulocytes 1 %    NRBCs per 100 WBC 0 <1 /100    Absolute Neutrophils  23.6 (H) 1.6 - 8.3 10e3/uL    Absolute Lymphocytes 1.7 0.8 - 5.3 10e3/uL    Absolute Monocytes 1.5 (H) 0.0 - 1.3 10e3/uL    Absolute Eosinophils 0.0 0.0 - 0.7 10e3/uL    Absolute Basophils 0.1 0.0 - 0.2 10e3/uL    Absolute Immature Granulocytes 0.4 <=0.4 10e3/uL    Absolute NRBCs 0.0 10e3/uL   Procalcitonin     Status: Normal   Result Value Ref Range    Procalcitonin <0.50 <0.50 ng/mL ng/mL   Magnesium     Status: Normal   Result Value Ref Range    Magnesium 2.2 1.9 - 2.7 mg/dL   Phosphorus     Status: Abnormal   Result Value Ref Range    Phosphorus 6.2 (H) 2.5 - 5.0 mg/dL   Basic metabolic panel     Status: Abnormal   Result Value Ref Range    Sodium 135 134 - 144 mmol/L    Potassium 5.3 (H) 3.5 - 5.1 mmol/L    Chloride 104 98 - 107 mmol/L    Carbon Dioxide (CO2) 6 (LL) 21 - 31 mmol/L    Anion Gap 25 (H) 3 - 14 mmol/L    Urea Nitrogen 24 7 - 25 mg/dL    Creatinine 1.14 0.70 - 1.30 mg/dL    Calcium 9.3 8.6 - 10.3 mg/dL    Glucose 273 (H) 70 - 105 mg/dL    GFR Estimate >90 >60 mL/min/1.73m2   Blood gas arterial and oxyhgb     Status: Abnormal   Result Value Ref Range    pH Arterial 7.03 (LL) 7.35 - 7.45    pCO2 Arterial 16 (LL) 35 - 45 mm Hg    pO2 Arterial 118 (H) 80 - 105 mm Hg    Bicarbonate Arterial 4 (LL) 21 - 28 mmol/L    Oxyhemoglobin Arterial 97 92 - 100 %    Base Excess/Deficit (+/-) -24.9 (L) -9.0 - 1.8 mmol/L    FIO2 0    UA reflex to Microscopic     Status: Abnormal   Result Value Ref Range    Color Urine Yellow Colorless, Straw, Light Yellow, Yellow    Appearance Urine Clear Clear    Glucose Urine >1000 (A) Negative mg/dL    Bilirubin Urine Negative Negative    Ketones Urine >150 (A) Negative mg/dL    Specific Gravity Urine 1.024 1.000 - 1.030    Blood Urine Large (A) Negative    pH Urine 5.0 5.0 - 9.0    Protein Albumin Urine 50  (A) Negative mg/dL    Urobilinogen Urine Normal Normal, 2.0 mg/dL    Nitrite Urine Negative Negative    Leukocyte Esterase Urine Negative Negative    RBC Urine <1 <=2 /HPF     WBC Urine <1 <=5 /HPF   Lactic acid whole blood     Status: Abnormal   Result Value Ref Range    Lactic Acid 2.6 (H) 0.7 - 2.0 mmol/L   CBC with platelets and differential     Status: Abnormal   Result Value Ref Range    WBC Count 26.0 (H) 4.0 - 11.0 10e3/uL    RBC Count 4.83 4.40 - 5.90 10e6/uL    Hemoglobin 15.0 13.3 - 17.7 g/dL    Hematocrit 44.9 40.0 - 53.0 %    MCV 93 78 - 100 fL    MCH 31.1 26.5 - 33.0 pg    MCHC 33.4 31.5 - 36.5 g/dL    RDW 11.4 10.0 - 15.0 %    Platelet Count 306 150 - 450 10e3/uL    % Neutrophils 83 %    % Lymphocytes 9 %    % Monocytes 7 %    % Eosinophils 0 %    % Basophils 0 %    % Immature Granulocytes 1 %    NRBCs per 100 WBC 0 <1 /100    Absolute Neutrophils 21.6 (H) 1.6 - 8.3 10e3/uL    Absolute Lymphocytes 2.3 0.8 - 5.3 10e3/uL    Absolute Monocytes 1.7 (H) 0.0 - 1.3 10e3/uL    Absolute Eosinophils 0.0 0.0 - 0.7 10e3/uL    Absolute Basophils 0.1 0.0 - 0.2 10e3/uL    Absolute Immature Granulocytes 0.3 <=0.4 10e3/uL    Absolute NRBCs 0.0 10e3/uL   Basic metabolic panel     Status: Abnormal   Result Value Ref Range    Sodium 130 (L) 134 - 144 mmol/L    Potassium 5.2 (H) 3.5 - 5.1 mmol/L    Chloride 103 98 - 107 mmol/L    Carbon Dioxide (CO2) 8 (LL) 21 - 31 mmol/L    Anion Gap 19 (H) 3 - 14 mmol/L    Urea Nitrogen 20 7 - 25 mg/dL    Creatinine 1.05 0.70 - 1.30 mg/dL    Calcium 9.1 8.6 - 10.3 mg/dL    Glucose 223 (H) 70 - 105 mg/dL    GFR Estimate >90 >60 mL/min/1.73m2   Blood gas venous and oxyhgb     Status: Abnormal   Result Value Ref Range    pH Venous 7.16 (LL) 7.32 - 7.43    pCO2 Venous 21 (L) 40 - 50 mm Hg    pO2 Venous 80 (H) 25 - 47 mm Hg    Bicarbonate Venous 8 (LL) 21 - 28 mmol/L    FIO2 0     Oxyhemoglobin Venous 95 (H) 70 - 75 %    Base Excess/Deficit (+/-) -19.2 (L) -7.7 - 1.9 mmol/L   Basic metabolic panel     Status: Abnormal   Result Value Ref Range    Sodium 128 (L) 134 - 144 mmol/L    Potassium 4.5 3.5 - 5.1 mmol/L    Chloride 102 98 - 107 mmol/L    Carbon Dioxide  (CO2) 12 (L) 21 - 31 mmol/L    Anion Gap 14 3 - 14 mmol/L    Urea Nitrogen 17 7 - 25 mg/dL    Creatinine 1.04 0.70 - 1.30 mg/dL    Calcium 8.9 8.6 - 10.3 mg/dL    Glucose 242 (H) 70 - 105 mg/dL    GFR Estimate >90 >60 mL/min/1.73m2   Blood gas venous and oxyhgb     Status: Abnormal   Result Value Ref Range    pH Venous 7.25 (L) 7.32 - 7.43    pCO2 Venous 27 (L) 40 - 50 mm Hg    pO2 Venous 84 (H) 25 - 47 mm Hg    Bicarbonate Venous 12 (L) 21 - 28 mmol/L    FIO2 0     Oxyhemoglobin Venous 95 (H) 70 - 75 %    Base Excess/Deficit (+/-) -13.5 (L) -7.7 - 1.9 mmol/L   CBC with platelets and differential     Status: Abnormal   Result Value Ref Range    WBC Count 18.7 (H) 4.0 - 11.0 10e3/uL    RBC Count 4.42 4.40 - 5.90 10e6/uL    Hemoglobin 13.8 13.3 - 17.7 g/dL    Hematocrit 40.0 40.0 - 53.0 %    MCV 91 78 - 100 fL    MCH 31.2 26.5 - 33.0 pg    MCHC 34.5 31.5 - 36.5 g/dL    RDW 11.4 10.0 - 15.0 %    Platelet Count 323 150 - 450 10e3/uL    % Neutrophils 80 %    % Lymphocytes 11 %    % Monocytes 8 %    % Eosinophils 0 %    % Basophils 0 %    % Immature Granulocytes 1 %    NRBCs per 100 WBC 0 <1 /100    Absolute Neutrophils 15.0 (H) 1.6 - 8.3 10e3/uL    Absolute Lymphocytes 2.1 0.8 - 5.3 10e3/uL    Absolute Monocytes 1.5 (H) 0.0 - 1.3 10e3/uL    Absolute Eosinophils 0.0 0.0 - 0.7 10e3/uL    Absolute Basophils 0.0 0.0 - 0.2 10e3/uL    Absolute Immature Granulocytes 0.2 <=0.4 10e3/uL    Absolute NRBCs 0.0 10e3/uL   EKG 12 lead     Status: None   Result Value Ref Range    Systolic Blood Pressure  mmHg    Diastolic Blood Pressure  mmHg    Ventricular Rate 94 BPM    Atrial Rate 94 BPM    RI Interval 166 ms    QRS Duration 86 ms     ms    QTc 447 ms    P Axis 67 degrees    R AXIS 80 degrees    T Axis 59 degrees    Interpretation ECG       Sinus rhythm with sinus arrhythmia  Normal ECG  No previous ECGs available  Confirmed by MD SEAN, EVELIN ([id number]) on 1/16/2022 8:40:00 AM     CBC with platelets differential     Status:  Abnormal    Narrative    The following orders were created for panel order CBC with platelets differential.  Procedure                               Abnormality         Status                     ---------                               -----------         ------                     CBC with platelets and d...[[id number]]  Abnormal            Final result                 Please view results for these tests on the individual orders.   CBC with platelets differential     Status: Abnormal    Narrative    The following orders were created for panel order CBC with platelets differential.  Procedure                               Abnormality         Status                     ---------                               -----------         ------                     CBC with platelets and d...[[id number]]  Abnormal            Final result                 Please view results for these tests on the individual orders.   CBC with platelets differential     Status: Abnormal    Narrative    The following orders were created for panel order CBC with platelets differential.  Procedure                               Abnormality         Status                     ---------                               -----------         ------                     CBC with platelets and d...[[id number]]  Abnormal            Final result                 Please view results for these tests on the individual orders.

## 2022-01-16 NOTE — PLAN OF CARE
"Patient admitted for DKA without coma. Alert and oriented x3. Pleasant and cooperative. IND in room. Mother in room with patient at times. IVF running. Denies pain. Chems elevated in insulin.  VSS. Tele # [id number]    /49 (BP Location: Left arm)   Pulse 88   Temp 98.6  F (37  C) (Tympanic)   Resp 20   Ht 1.778 m (5' 10\")   Wt 64.9 kg (143 lb 1.6 oz)   SpO2 98%   BMI 20.53 kg/m       /46 (BP Location: Right arm, Patient Position: Semi-Lovell's)   Pulse 86   Temp 98.4  F (36.9  C) (Tympanic)   Resp 20   Ht 1.778 m (5' 10\")   Wt 64.9 kg (143 lb 1.6 oz)   SpO2 97%   BMI 20.53 kg/m        "

## 2022-01-16 NOTE — ASSESSMENT & PLAN NOTE
21-year-old type I diabetic presenting with malaise, body aches the past day  Using insulin pump although using old pump without glucose monitoring  On arrival, hyperglycemic with blood sugar force 61 with elevated ketones, elevated anion gap and a blood gas showing pH of 7.07.  Due to lack of ICU bed space and not able to transfer to [facility] where he is normally treated, he has been treated in the ED  Improving after insulin infusion with improvement of electrolytes and acid-base abnormalities  His case was reviewed by ER and endocrine with the recommendation this a.m. of transferring over to 20 units of a.m. Lantus with 1-12 carb coverage with meals and sliding scale coverage.  Patient will be admitted to inpatient on the medical floor, will continue these recommendations, IV fluids  Expect to be in the hospital for the least the next day with the plan for him to restart insulin pump at discharge after he gets the necessary supplies

## 2022-01-16 NOTE — PROVIDER NOTIFICATION
01/16/22 1114   Valuables   Patient Belongings remains with patient   Patient Belongings Remaining with Patient cell phone/electronics;clothing;glasses;medical device;shoes   Did you bring any home meds/supplements to the hospital?  No       List items sent to safe: none    I have reviewed my belongings list on admission and verify that it is correct.     Patient signature_______________________________      I have received all my belongings noted above at discharge.    Patient signature________________________________

## 2022-01-16 NOTE — PROGRESS NOTES
"NS ADMISSION NOTE    Patient admitted to room 333 at approximately 1021 via wheel chair from emergency room. Patient was accompanied by mother.     Verbal SBAR report received from [facility] prior to patient arrival.     Patient ambulated to bed independently. Patient alert and oriented X 3. The patient is not having any pain.  . Admission vital signs: Blood pressure 111/49, pulse 88, temperature 98.6  F (37  C), temperature source Tympanic, resp. rate 20, height 1.778 m (5' 10\"), weight 64.9 kg (143 lb 1.6 oz), SpO2 98 %. Patient and mother was oriented to plan of care, call light, bed controls, tv, telephone, bathroom and visiting hours.     Risk Assessment    The following safety risks were identified during admission: none. Yellow risk band applied: NO.     Skin Initial Assessment    This writer admitted this patient and completed a full skin assessment and Rakan score in the Adult PCS flowsheet. Appropriate interventions initiated as needed.       Education    Patient has a [place] to Observation order: No  Observation education completed and documented: N/A      Yazmin Mahajan RN    "

## 2022-01-16 NOTE — ASSESSMENT & PLAN NOTE
Presenting ill with malaise, no fever with tachycardia, leukocytosis with elevated lactate but normal procalcitonin.  SIRS criteria likely set off by DKA, no clear indication of infectious etiology  This a.m. clinically looks well, white count trending down  He will be admitted for treatment of DKA, hold antibiotic coverage at this point  Has been treated with aggressive IV fluids, eating and drinking normally now

## 2022-01-16 NOTE — ED PROVIDER NOTES
History     Chief Complaint   Patient presents with     Nausea & Vomiting       Sd Obrien is a 21 year old male with history including type 1 diabetes on insulin pump who presents with vomiting headache and body aches.  Yesterday patient developed nausea which progressed today to vomiting, headache, and body aches.  He has been unable to keep any food down today.  There have been no changes to his insulin recently.  He did skip his insulin bolus today as result of the anorexia.  He is currently using an old Medtronic insulin pump awaiting to get back on his the tandem t:slim system which he had previously been using.  He also reports issues with Dexcom sensors not working, and therefore has not been using a glucose monitor. Denies any sick contacts.  COVID vaccinated. Denies fever, chills, dyspnea, abdominal or other pain, urinary changes, diarrhea, neck stiffness, confusion. Denies prior known history of DKA.    No Known Allergies    Patient Active Problem List    Diagnosis Date Noted     Insulin pump status 03/02/2016     Priority: Medium     Formatting of this note might be different from the original.  Basal insulin replacement for pump failure, 19.5 units (3/19/2021)       Type 1 diabetes mellitus without complication (H) 01/11/2016     Priority: Medium       History reviewed. No pertinent past medical history.    History reviewed. No pertinent surgical history.    History reviewed. No pertinent family history.    Social History     Tobacco Use     Smoking status: Passive Smoke Exposure - Never Smoker     Smokeless tobacco: Never Used     Tobacco comment: Quit smoking: dad smokes outside   Substance Use Topics     Alcohol use: Never     Drug use: Never       Medications:    blood glucose (NO BRAND SPECIFIED) test strip  CONTOUR NEXT TEST test strip  insulin lispro (HUMALOG) 100 UNIT/ML vial        Review of Systems: See HPI for pertinent negatives and positives. All other systems reviewed and found to be  "negative.    Physical Exam   BP 95/50   Pulse 95   Temp 97.2  F (36.2  C) (Tympanic)   Resp 13   Ht 1.778 m (5' 10\")   Wt 65.1 kg (143 lb 8.3 oz)   SpO2 98%   BMI 20.59 kg/m       General: awake, comfortable, mildly ill appearing  HEENT: atraumatic  Respiratory: normal effort, clear to auscultation bilaterally  Cardiovascular: regular rate and rhythm, no murmurs  Abdomen: soft, nondistended, nontender  Extremities: no deformities, edema, or tenderness  Skin: warm, dry, no rashes  Neuro: alert, no focal deficits  Psych: appropriate mood and affect    ED Course      ED Course as of 01/16/22 0815   Sat Vick 15, 2022   2127 [facility] where patient follows with endocrinology on ICU divert.   2129 EKG: NSR, nonischemic with no ST abnormalities, LBBB, I/II/V3-6 TWI. Peaked T waves c/w hyperkalemia. Normal QRS.     2144 [facility] endocrinology consult (Dr Laws): With regards to high WBC with negative PCT - may be hemodiluation. Recommends repeat CBC. Agrees with UA/CXR for occult infection. When/if we get out of DKA recommend starting Lantus 20 units, NovoLog or Humalog 1: 12 carb ratio, and low SSI with contacting  Endo clinic first thing Monday morning for appointment. He should stop pump since likely not working appropriately.   2322 Feeling much better despite continued significant leukocytosis. UA and CXR noninfectious.   Sun Jan 16, 2022   0141 Improving ph and bicarb. Patient feeling well. Suspect leukocytosis is stress leukomoid reaction with the significant acidosis initially present.       Results for orders placed or performed during the hospital encounter of 01/15/22 (from the past 24 hour(s))   Hemoglobin A1c   Result Value Ref Range    Hemoglobin A1C 11.8 (H) 4.0 - 6.2 %   CBC with platelets differential    Narrative    The following orders were created for panel order CBC with platelets differential.  Procedure                               Abnormality         Status                   "   ---------                               -----------         ------                     CBC with platelets and d...[[id number]]  Abnormal            Final result                 Please view results for these tests on the individual orders.   Basic metabolic panel   Result Value Ref Range    Sodium 128 (L) 134 - 144 mmol/L    Potassium 7.1 (HH) 3.5 - 5.1 mmol/L    Chloride 93 (L) 98 - 107 mmol/L    Carbon Dioxide (CO2) 6 (LL) 21 - 31 mmol/L    Anion Gap 29 (H) 3 - 14 mmol/L    Urea Nitrogen 26 (H) 7 - 25 mg/dL    Creatinine 1.39 (H) 0.70 - 1.30 mg/dL    Calcium 10.2 8.6 - 10.3 mg/dL    Glucose 416 (H) 70 - 105 mg/dL    GFR Estimate 74 >60 mL/min/1.73m2   Ketone Beta-Hydroxybutyrate Quantitative   Result Value Ref Range    Ketone (Beta-Hydroxybutyrate) Quantitative 6.1 (HH) 0.0 - 0.6 mmol/L   CBC with platelets and differential   Result Value Ref Range    WBC Count 27.3 (H) 4.0 - 11.0 10e3/uL    RBC Count 5.31 4.40 - 5.90 10e6/uL    Hemoglobin 16.5 13.3 - 17.7 g/dL    Hematocrit 49.2 40.0 - 53.0 %    MCV 93 78 - 100 fL    MCH 31.1 26.5 - 33.0 pg    MCHC 33.5 31.5 - 36.5 g/dL    RDW 11.5 10.0 - 15.0 %    Platelet Count 400 150 - 450 10e3/uL    % Neutrophils 86 %    % Lymphocytes 6 %    % Monocytes 6 %    % Eosinophils 0 %    % Basophils 1 %    % Immature Granulocytes 1 %    NRBCs per 100 WBC 0 <1 /100    Absolute Neutrophils 23.6 (H) 1.6 - 8.3 10e3/uL    Absolute Lymphocytes 1.7 0.8 - 5.3 10e3/uL    Absolute Monocytes 1.5 (H) 0.0 - 1.3 10e3/uL    Absolute Eosinophils 0.0 0.0 - 0.7 10e3/uL    Absolute Basophils 0.1 0.0 - 0.2 10e3/uL    Absolute Immature Granulocytes 0.4 <=0.4 10e3/uL    Absolute NRBCs 0.0 10e3/uL   Procalcitonin   Result Value Ref Range    Procalcitonin <0.50 <0.50 ng/mL ng/mL   Magnesium   Result Value Ref Range    Magnesium 2.2 1.9 - 2.7 mg/dL   Phosphorus   Result Value Ref Range    Phosphorus 6.2 (H) 2.5 - 5.0 mg/dL   Symptomatic; Yes; 1/14/2022 Influenza A/B & SARS-CoV2 (COVID-19) Virus PCR  Multiplex Nose    Specimen: Nose; Swab   Result Value Ref Range    Influenza A PCR Negative Negative    Influenza B PCR Negative Negative    RSV PCR Negative Negative    SARS CoV2 PCR Negative Negative    Narrative    Testing was performed using the Xpert Xpress CoV2/Flu/RSV Assay on the [employer] GeneXpert Instrument. This test should be ordered for the detection of SARS-CoV-2 and influenza viruses in individuals who meet clinical and/or epidemiological criteria. Test performance is unknown in asymptomatic patients. This test is for in vitro diagnostic use under the FDA EUA for laboratories certified under CLIA to perform high or moderate complexity testing. This test has not been FDA cleared or approved. A negative result does not rule out the presence of PCR inhibitors in the specimen or target RNA in concentration below the limit of detection for the assay. If only one viral target is positive but coinfection with multiple targets is suspected, the sample should be re-tested with another FDA cleared, approved, or authorized test, if coinfection would change clinical management. This test was validated by the [facility]. These laboratories are certified under the Clinical  Laboratory Improvement Amendments of 1988 (CLIA-88) as qualified to perform high complexity laboratory testing.   Blood gas arterial and oxyhgb   Result Value Ref Range    pH Arterial 7.07 (LL) 7.35 - 7.45    pCO2 Arterial 15 (LL) 35 - 45 mm Hg    pO2 Arterial 116 (H) 80 - 105 mm Hg    Bicarbonate Arterial 5 (LL) 21 - 28 mmol/L    Oxyhemoglobin Arterial 97 92 - 100 %    Base Excess/Deficit (+/-) -23.6 (L) -9.0 - 1.8 mmol/L    FIO2 0    UA reflex to Microscopic   Result Value Ref Range    Color Urine Yellow Colorless, Straw, Light Yellow, Yellow    Appearance Urine Clear Clear    Glucose Urine >1000 (A) Negative mg/dL    Bilirubin Urine Negative Negative    Ketones Urine >150 (A) Negative mg/dL    Specific Gravity Urine 1.024  1.000 - 1.030    Blood Urine Large (A) Negative    pH Urine 5.0 5.0 - 9.0    Protein Albumin Urine 50  (A) Negative mg/dL    Urobilinogen Urine Normal Normal, 2.0 mg/dL    Nitrite Urine Negative Negative    Leukocyte Esterase Urine Negative Negative    RBC Urine <1 <=2 /HPF    WBC Urine <1 <=5 /HPF   Blood gas arterial and oxyhgb   Result Value Ref Range    pH Arterial 7.03 (LL) 7.35 - 7.45    pCO2 Arterial 16 (LL) 35 - 45 mm Hg    pO2 Arterial 118 (H) 80 - 105 mm Hg    Bicarbonate Arterial 4 (LL) 21 - 28 mmol/L    Oxyhemoglobin Arterial 97 92 - 100 %    Base Excess/Deficit (+/-) -24.9 (L) -9.0 - 1.8 mmol/L    FIO2 0    Lactic acid whole blood   Result Value Ref Range    Lactic Acid 2.6 (H) 0.7 - 2.0 mmol/L   Basic metabolic panel   Result Value Ref Range    Sodium 135 134 - 144 mmol/L    Potassium 5.3 (H) 3.5 - 5.1 mmol/L    Chloride 104 98 - 107 mmol/L    Carbon Dioxide (CO2) 6 (LL) 21 - 31 mmol/L    Anion Gap 25 (H) 3 - 14 mmol/L    Urea Nitrogen 24 7 - 25 mg/dL    Creatinine 1.14 0.70 - 1.30 mg/dL    Calcium 9.3 8.6 - 10.3 mg/dL    Glucose 273 (H) 70 - 105 mg/dL    GFR Estimate >90 >60 mL/min/1.73m2   CBC with platelets differential    Narrative    The following orders were created for panel order CBC with platelets differential.  Procedure                               Abnormality         Status                     ---------                               -----------         ------                     CBC with platelets and d...[[id number]]  Abnormal            Final result                 Please view results for these tests on the individual orders.   CBC with platelets and differential   Result Value Ref Range    WBC Count 26.0 (H) 4.0 - 11.0 10e3/uL    RBC Count 4.83 4.40 - 5.90 10e6/uL    Hemoglobin 15.0 13.3 - 17.7 g/dL    Hematocrit 44.9 40.0 - 53.0 %    MCV 93 78 - 100 fL    MCH 31.1 26.5 - 33.0 pg    MCHC 33.4 31.5 - 36.5 g/dL    RDW 11.4 10.0 - 15.0 %    Platelet Count 306 150 - 450 10e3/uL    %  Neutrophils 83 %    % Lymphocytes 9 %    % Monocytes 7 %    % Eosinophils 0 %    % Basophils 0 %    % Immature Granulocytes 1 %    NRBCs per 100 WBC 0 <1 /100    Absolute Neutrophils 21.6 (H) 1.6 - 8.3 10e3/uL    Absolute Lymphocytes 2.3 0.8 - 5.3 10e3/uL    Absolute Monocytes 1.7 (H) 0.0 - 1.3 10e3/uL    Absolute Eosinophils 0.0 0.0 - 0.7 10e3/uL    Absolute Basophils 0.1 0.0 - 0.2 10e3/uL    Absolute Immature Granulocytes 0.3 <=0.4 10e3/uL    Absolute NRBCs 0.0 10e3/uL   XR Chest 2 Views    Narrative    PROCEDURE INFORMATION:   Exam: XR Chest   Exam date and time: 1/15/2022 11:04 PM   Age: 21 years old   Clinical indication: Shortness of breath; Additional info: ? Occult pna with   wbc 27     TECHNIQUE:   Imaging protocol: XR of the chest.   Views: 2 views.     COMPARISON:   No relevant prior studies available.     FINDINGS:   Lungs: No consolidative infiltrate.   Pleural spaces: No pleural effusion or pneumothorax.   Heart/Mediastinum: Normal heart size.   Bones/joints: No acute finding.       Impression    IMPRESSION:   No radiographic signs of acute process.     THIS DOCUMENT HAS BEEN ELECTRONICALLY SIGNED BY CANDACE MONTOYA MD   Basic metabolic panel   Result Value Ref Range    Sodium 130 (L) 134 - 144 mmol/L    Potassium 5.2 (H) 3.5 - 5.1 mmol/L    Chloride 103 98 - 107 mmol/L    Carbon Dioxide (CO2) 8 (LL) 21 - 31 mmol/L    Anion Gap 19 (H) 3 - 14 mmol/L    Urea Nitrogen 20 7 - 25 mg/dL    Creatinine 1.05 0.70 - 1.30 mg/dL    Calcium 9.1 8.6 - 10.3 mg/dL    Glucose 223 (H) 70 - 105 mg/dL    GFR Estimate >90 >60 mL/min/1.73m2   Blood gas venous and oxyhgb   Result Value Ref Range    pH Venous 7.16 (LL) 7.32 - 7.43    pCO2 Venous 21 (L) 40 - 50 mm Hg    pO2 Venous 80 (H) 25 - 47 mm Hg    Bicarbonate Venous 8 (LL) 21 - 28 mmol/L    FIO2 0     Oxyhemoglobin Venous 95 (H) 70 - 75 %    Base Excess/Deficit (+/-) -19.2 (L) -7.7 - 1.9 mmol/L   CBC with platelets differential    Narrative    The following orders were  created for panel order CBC with platelets differential.  Procedure                               Abnormality         Status                     ---------                               -----------         ------                     CBC with platelets and d...[[id number]]  Abnormal            Final result                 Please view results for these tests on the individual orders.   Basic metabolic panel   Result Value Ref Range    Sodium 128 (L) 134 - 144 mmol/L    Potassium 4.5 3.5 - 5.1 mmol/L    Chloride 102 98 - 107 mmol/L    Carbon Dioxide (CO2) 12 (L) 21 - 31 mmol/L    Anion Gap 14 3 - 14 mmol/L    Urea Nitrogen 17 7 - 25 mg/dL    Creatinine 1.04 0.70 - 1.30 mg/dL    Calcium 8.9 8.6 - 10.3 mg/dL    Glucose 242 (H) 70 - 105 mg/dL    GFR Estimate >90 >60 mL/min/1.73m2   Blood gas venous and oxyhgb   Result Value Ref Range    pH Venous 7.25 (L) 7.32 - 7.43    pCO2 Venous 27 (L) 40 - 50 mm Hg    pO2 Venous 84 (H) 25 - 47 mm Hg    Bicarbonate Venous 12 (L) 21 - 28 mmol/L    FIO2 0     Oxyhemoglobin Venous 95 (H) 70 - 75 %    Base Excess/Deficit (+/-) -13.5 (L) -7.7 - 1.9 mmol/L   CBC with platelets and differential   Result Value Ref Range    WBC Count 18.7 (H) 4.0 - 11.0 10e3/uL    RBC Count 4.42 4.40 - 5.90 10e6/uL    Hemoglobin 13.8 13.3 - 17.7 g/dL    Hematocrit 40.0 40.0 - 53.0 %    MCV 91 78 - 100 fL    MCH 31.2 26.5 - 33.0 pg    MCHC 34.5 31.5 - 36.5 g/dL    RDW 11.4 10.0 - 15.0 %    Platelet Count 323 150 - 450 10e3/uL    % Neutrophils 80 %    % Lymphocytes 11 %    % Monocytes 8 %    % Eosinophils 0 %    % Basophils 0 %    % Immature Granulocytes 1 %    NRBCs per 100 WBC 0 <1 /100    Absolute Neutrophils 15.0 (H) 1.6 - 8.3 10e3/uL    Absolute Lymphocytes 2.1 0.8 - 5.3 10e3/uL    Absolute Monocytes 1.5 (H) 0.0 - 1.3 10e3/uL    Absolute Eosinophils 0.0 0.0 - 0.7 10e3/uL    Absolute Basophils 0.0 0.0 - 0.2 10e3/uL    Absolute Immature Granulocytes 0.2 <=0.4 10e3/uL    Absolute NRBCs 0.0 10e3/uL        Medications   ondansetron (ZOFRAN) injection 4 mg (4 mg Intravenous Given 1/15/22 2018)   dextrose 50 % injection 25-50 mL (has no administration in time range)   insulin 1 unit/1 mL in NS (NovoLIN, HumuLIN Regular) drip -ED DKA algorithm (4 Units/hr Intravenous Rate/Dose Change 1/16/22 2309)   dextrose 5% and 0.45% NaCl infusion ( Intravenous Canceled Entry 1/16/22 0750)   insulin glargine (LANTUS PEN) injection 20 Units (has no administration in time range)   0.9% sodium chloride BOLUS (0 mLs Intravenous Stopped 1/15/22 2153)   ketorolac (TORADOL) injection 15 mg (15 mg Intravenous Given 1/15/22 2026)   0.9% sodium chloride BOLUS (0 mLs Intravenous Stopped 1/15/22 2313)   calcium gluconate 10 % injection 1 g (0 g Intravenous Stopped 1/15/22 2210)   insulin (regular) (HumuLIN R/NovoLIN R) injection 7 Units (7 Units Intravenous Given 1/15/22 2150)       Assessments & Plan (with Medical Decision Making)     I have reviewed the nursing notes.    21 year old male evaluated for nausea vomiting headache and body aches in setting of poorly controlled diabetes with recent use of older insulin pump and no use of his Dexcom glucose monitor.  Afebrile, mildly tachycardic.  Benign exam.  Found to be in DKA.  Initial labs remarkable for pH 7.07, bicarb 5, potassium 7.1, ketones 6.1, A1c 11.8, and leukocytosis 27.1 with left shift. EKG with peaked T waves. DKA protocol followed. Calcium carbonate given for hyperkalemia. Labs normalizing including leukocytosis likely due to significant acidosis. Search for other causes for DKA outside of insulin compliance/pump malfunction included occult infection with the marked leukocytosis, however with uninfected appearing UA and chest x-ray.  Patient given lantus 20 U this morning with plan for 2 hr overlap with insulin drip until approximately 10 am. At this point he can be transferred to general medical floor. See above ED course for endocrinology recommendations (Lantus 20 units  daily, NovoLog or Humalog 1: 12 carb ratio, and low sliding scale insulin) for immediate-term with injectable insulin and follow up tomorrow in endocrinology clinic. Case discussed with Dr. Krishnan who accepts admission.    I have reviewed the findings, diagnosis, plan with the patient.    150 minutes of critical care time was spent with this patient, exclusive of all other separately billable services.    New Prescriptions    No medications on file       Final diagnoses:   Diabetic ketoacidosis without coma associated with type 1 diabetes mellitus (H)       1/15/2022   [facility]     Ruperto Ashley MD  01/16/22 0815

## 2022-01-17 VITALS
WEIGHT: 145.2 LBS | OXYGEN SATURATION: 99 % | TEMPERATURE: 97.5 F | HEIGHT: 70 IN | DIASTOLIC BLOOD PRESSURE: 50 MMHG | RESPIRATION RATE: 16 BRPM | SYSTOLIC BLOOD PRESSURE: 103 MMHG | BODY MASS INDEX: 20.79 KG/M2 | HEART RATE: 72 BPM

## 2022-01-17 LAB
ANION GAP SERPL CALCULATED.3IONS-SCNC: 7 MMOL/L (ref 3–14)
BUN SERPL-MCNC: 17 MG/DL (ref 7–25)
CALCIUM SERPL-MCNC: 8.6 MG/DL (ref 8.6–10.3)
CHLORIDE BLD-SCNC: 109 MMOL/L (ref 98–107)
CO2 SERPL-SCNC: 22 MMOL/L (ref 21–31)
CREAT SERPL-MCNC: 0.88 MG/DL (ref 0.7–1.3)
GFR SERPL CREATININE-BSD FRML MDRD: >90 ML/MIN/1.73M2
GLUCOSE BLD-MCNC: 141 MG/DL (ref 70–105)
POTASSIUM BLD-SCNC: 4 MMOL/L (ref 3.5–5.1)
SODIUM SERPL-SCNC: 138 MMOL/L (ref 134–144)

## 2022-01-17 PROCEDURE — 36415 COLL VENOUS BLD VENIPUNCTURE: CPT | Performed by: FAMILY MEDICINE

## 2022-01-17 PROCEDURE — 99239 HOSP IP/OBS DSCHRG MGMT >30: CPT | Performed by: FAMILY MEDICINE

## 2022-01-17 PROCEDURE — 80048 BASIC METABOLIC PNL TOTAL CA: CPT | Performed by: FAMILY MEDICINE

## 2022-01-17 ASSESSMENT — MIFFLIN-ST. JEOR: SCORE: 1669.87

## 2022-01-17 ASSESSMENT — ACTIVITIES OF DAILY LIVING (ADL)
ADLS_ACUITY_SCORE: 6

## 2022-01-17 NOTE — PLAN OF CARE
VSS, afebrile, denies pain.  Bedtime glucose 213 - 1 unit sliding scale novolog and 20 units scheduled lantus given.  IND in room.  IV in left arm removed due to pain and redness.  IV in right arm patent and running NS at 100 mL/hr.  Mom rooming in [facility].  Will continue to monitor.  Yareli Armstrong RN............................ 1/17/2022 12:44 AM    No change in condition overnight.  0200 glucose check 124.  Was able to sleep some during the night.  Yareli Armstrong RN............................ 1/17/2022 6:15 AM

## 2022-01-17 NOTE — PROGRESS NOTES
SAFETY CHECKLIST  ID Bands and Risk clasps correct and in place (DNR, Fall risk, Allergy, Latex, Limb):  Yes  All Lines Reconciled and labeled correctly: Yes  Whiteboard updated:Yes  Environmental interventions (bed/chair alarm on, call light, side rails, restraints, sitter....): Yes  Verify Tele #:[id number]  Nan Franks RN on 1/17/2022 at 8:49 AM

## 2022-01-17 NOTE — PHARMACY - DISCHARGE MEDICATION RECONCILIATION AND EDUCATION
"Pharmacy:  Discharge Counseling and Medication Reconciliation    Sd Obrien  [address] [address] [place] [address]  [phone] (home)   21 year old male  PCP: No Ref-Primary, Physician    Allergies: Patient has no known allergies.    Discharge Counseling:    No medication changes. Reviewed pump issues with physician.  Pharmacist met with patient.  r family) today to review the medication portion of the After Visit Summary Discharge Medication Reconciliation:    It has been determined that the patient has an adequate supply of medications available or which can be obtained from the patient's preferred pharmacy, which he has confirmed as: [facility]. Patient is having trouble getting Tslim cartridges. He thinks the \"carb ratio\" might need to be adjusted. He will follow up with Endocrinology.    Thank you for the consult.    Dania Ron [facility]........January 17, 2022 9:25 AM    Contacted [facility] pharmacy to determine who could supply.  [facility] does carry T slim supplies. They need an order.  Contacted endocrinology and requested orders.  Provided information to patient and mom.  Dania Ron [facility] on 1/17/2022 at 10:35 AM          "

## 2022-01-17 NOTE — DISCHARGE SUMMARY
[facility]  Hospitalist Discharge Summary      Date of Admission:  1/15/2022  Date of Discharge:  1/17/2022  Discharging Provider: Kaylie Pate,     Discharge Diagnoses   Diabetic ketoacidosis    Follow-ups Needed After Discharge   Follow-up Appointments     Follow-up and recommended labs and tests       Follow up with endocrinology as scheduled within 7 days for hospital   follow- up.  No follow up labs or test are needed.             Unresulted Labs Ordered in the Past 30 Days of this Admission     Date and Time Order Name Status Description    1/17/2022  8:39 AM Basic metabolic panel In process       These results will be followed up by     Discharge Disposition   Discharged to home  Condition at discharge: Stable      Hospital Course    Sd Obrien is a 21 year old male admitted on 1/15/2022. He presents from home to the ED not feeling well for the past day with malaise, poor appetite.  Patient is type I diabetic, normally controlled with use of insulin pump and Dexcom monitor although due to lack of supplies has been using an old [employer] pump without glucose monitoring.  On arrival to the ED his blood sugar was elevated greater than 400 with other chemical indicators showing him to be in DKA with a pH of 7.07, increased anion gap with other electrolyte abnormalities including hyponatremia, hyperkalemia and elevated lactate.  He denies fever but white count was markedly elevated and he met SIRS criteria, but no signs of obvious infectious etiology with procalcitonin being negative.  Due to lack of ICU space both here and at [facility] he has been treated in the emergency department with continuous insulin infusion and has improved overnight, this a.m. at baseline mental status, blood sugar stable with electrolytes improving.      Diabetic ketoacidosis without coma associated with type 1 diabetes mellitus (H). 21-year-old type I diabetic presenting with malaise, body aches the past  day. Using insulin pump although using old pump without glucose monitoring. On arrival, hyperglycemic with blood sugar 461 with elevated ketones, elevated anion gap and a blood gas showing pH of 7.07. He was treated with IV insulin and transitioned to lantus with SSI. Doing well at discharge. I spoke with endocrinology. Plan is to get him back on his prior dose of insulin, make sure he has supplies and have him call in 1-2 days for adjustments to meds as needed.      SIRS (systemic inflammatory response syndrome) (H)  Presenting ill with malaise, no fever with tachycardia, leukocytosis with elevated lactate but normal procalcitonin. SIRS criteria likely set off by DKA, no clear indication of infectious etiology.  Has been treated with aggressive IV fluids, eating and drinking normally now     Hyponatremia  Likely secondary to his DKA. Improved with NS. Resume regular diet at home.        Consultations This Hospital Stay   None    Code Status   Full Code    Time Spent on this Encounter   I, Kaylie Pate DO, personally saw the patient today and spent greater than 30 minutes discharging this patient.       Kaylie Pate DO  [facility]  [address]  [place] [address]  Phone: [phone]  Fax: [phone]  ______________________________________________________________________    Physical Exam   Vital Signs: Temp: (P) 98  F (36.7  C) Temp src: (P) Tympanic BP: (P) 105/57 Pulse: (P) 57   Resp: (P) 18 SpO2: (P) 97 % O2 Device: (P) None (Room air)    Weight: 145 lbs 3.2 oz     General Appearance: AAO, NAD. Thin but well hydrated and well developed  Respiratory: CTA B/L -w/r/r  Cardiovascular: RR. -murmur  GI: abd soft, BS+  Skin: warm, dry  Other:         Primary Care Physician   Physician No Ref-Primary    Discharge Orders      Reason for your hospital stay    Diabetic ketoacidosis     Follow-up and recommended labs and tests     Follow up with endocrinology as scheduled within  7 days for hospital follow- up.  No follow up labs or test are needed.     Activity    Your activity upon discharge: activity as tolerated     Diet    Follow this diet upon discharge: Orders Placed This Encounter      Moderate Consistent Carb (60 g CHO per Meal) Diet       Significant Results and Procedures   Results for orders placed or performed during the hospital encounter of 01/15/22   XR Chest 2 Views    Narrative    PROCEDURE INFORMATION:   Exam: XR Chest   Exam date and time: 1/15/2022 11:04 PM   Age: 21 years old   Clinical indication: Shortness of breath; Additional info: ? Occult pna with   wbc 27     TECHNIQUE:   Imaging protocol: XR of the chest.   Views: 2 views.     COMPARISON:   No relevant prior studies available.     FINDINGS:   Lungs: No consolidative infiltrate.   Pleural spaces: No pleural effusion or pneumothorax.   Heart/Mediastinum: Normal heart size.   Bones/joints: No acute finding.       Impression    IMPRESSION:   No radiographic signs of acute process.     THIS DOCUMENT HAS BEEN ELECTRONICALLY SIGNED BY CANDACE MONTOYA MD       Discharge Medications   Current Discharge Medication List      CONTINUE these medications which have NOT CHANGED    Details   !! blood glucose (NO BRAND SPECIFIED) test strip 6 times a day, patient needs these to use with his insulin pump, cannot use another test strip, start PA if necessary.      !! CONTOUR NEXT TEST test strip TESTING 6 TIMES DAILY      insulin lispro (HUMALOG) 100 UNIT/ML vial For use in insulin pump; BR 12A 0.85, 6A 0.80, 11A 0.75,  4P 0.8 10P 0.90  CR: 12A 10 SF 35, TDD - 60 units       !! - Potential duplicate medications found. Please discuss with provider.        Allergies   No Known Allergies

## 2022-01-17 NOTE — DISCHARGE INSTRUCTIONS
I spoke with the endocrinologist on-call in [place] today.  They recommend going back to your usual insulin basal dosing.  Continue to check blood sugars over the next 1 to 2 days and call them for insulin adjustments over the phone.  We will schedule you an appointment within the next 1 to 2 weeks for follow-up.  If we are unable to do that please call your regular endocrinologist to get in for an appointment.  Additionally, I have entered a referral for diabetes education for additional nutrition education.

## 2022-01-17 NOTE — PROGRESS NOTES
SAFETY CHECKLIST  ID Bands and Risk clasps correct and in place (DNR, Fall risk, Allergy, Latex, Limb):  Yes  All Lines Reconciled and labeled correctly: Yes  Whiteboard updated:Yes  Environmental interventions (bed/chair alarm on, call light, side rails, restraints, sitter....): Yes  Verify Tele #: [id number]

## 2022-01-17 NOTE — PLAN OF CARE
Patient understands discharge instructions, able to discharge to home with mother follow up appointment made for 1/26/22 with primary in [place] at [facility]. Nan Franks RN on 1/17/2022 at 9:49 AM

## 2022-01-17 NOTE — PROGRESS NOTES
Weights reviewed per MST screen; pt has maintained over past 5 months. Ate 75% at dinner last night. Will monitor intakes and weights. Recommend outpatient diabetic education for elevated A1C.     Wt Readings from Last 10 Encounters:   01/17/22 65.9 kg (145 lb 3.2 oz)   08/06/21 67.7 kg (149 lb 4 oz)   04/01/14 53.1 kg (117 lb) (64 %, Z= 0.35)*     * Growth percentiles are based on CDC (Boys, 2-20 Years) data.   Jeanna Almodovar RD on 1/17/2022 at 9:18 AM

## 2022-01-18 ENCOUNTER — PATIENT OUTREACH (OUTPATIENT)
Dept: CARE COORDINATION | Facility: CLINIC | Age: 22
End: 2022-01-18
Payer: COMMERCIAL

## 2022-01-18 NOTE — PROGRESS NOTES
Clinic Care Coordination Contact    Patient has PCP elsewhere, no follow-up here. No TCM call required per policy  Per Chart review [facility].   Endocrinology - [facility] follow up scheduled.   Chhaya Almazan RN ,....................  1/18/2022   7:52 AM

## 2022-01-31 ENCOUNTER — VIRTUAL VISIT (OUTPATIENT)
Dept: EDUCATION SERVICES | Facility: OTHER | Age: 22
End: 2022-01-31
Attending: FAMILY MEDICINE
Payer: COMMERCIAL

## 2022-01-31 DIAGNOSIS — E10.9 TYPE 1 DIABETES MELLITUS WITHOUT COMPLICATION (H): ICD-10-CM

## 2022-01-31 PROCEDURE — G0108 DIAB MANAGE TRN  PER INDIV: HCPCS | Mod: GT,95 | Performed by: REGISTERED NURSE

## 2022-01-31 NOTE — PROGRESS NOTES
Diabetes Education Progress Note  Continuous Glucose Monitoring System (CGMS) Review    SUBJECTIVE: Sd is a 21 y.o. male who has type 1 diabetes and visits, by video, for Dexcom G6 Continuous Glucose Monitoring System (CGMS) download.     OBJECTIVE:  Diabetes medications   yes:     Diabetes Medication(s)     Insulin       insulin lispro (HUMALOG) 100 UNIT/ML vial    For use in insulin pump; BR 12A 0.85, 6A 0.80, 11A 0.75,  4P 0.8 10P 0.90  CR: 12A 10 SF 35, TDD - 60 units        Insulin Pump Type: Tandem TSlim X2 insulin pump with [employer]      CGM download glucose results:    Report shows 218 mg/dl average sensor glucose over the past four days.  Standard deviation (SD) is +- 84 mg/dl. Goal for SD is +-50 mg/dl or lower.      GMI (Glucose Management Indicator or HbA1c ) n/a due to needing more data.     Glucose 30% in target (70 - 180), 35% high (181 - 249), 32% very high (250 - 400), 2% low (56 - 69) and 1% very low (39 - 55).      Most significant pattern of nighttime highs found between 1:05 AM and 5:10 AM and significant pattern of daytime highs found between 3:50 PM and 4:20 PM.    No significant patterns of lows found.    Plan:    1.  Request patient to upload his pump to [facility] for insulin pump review.    2.  Helped patient with phone apps and pairing devices.  Patient decided to share his Dexcom data with [facility].  He also requested an invite to [facility] Diabetes and invitation sent.    3.  Patient will follow up tomorrow, for Tandem and CGM review/assessment.       Time spent with patient was 30 minutes.     Shania Romero RN, BSN, [facility]  1/31/2022 4:00 PM

## 2022-03-26 ENCOUNTER — HEALTH MAINTENANCE LETTER (OUTPATIENT)
Age: 22
End: 2022-03-26

## 2022-05-21 ENCOUNTER — HEALTH MAINTENANCE LETTER (OUTPATIENT)
Age: 22
End: 2022-05-21

## 2022-08-28 ENCOUNTER — HOSPITAL ENCOUNTER (EMERGENCY)
Facility: OTHER | Age: 22
Discharge: HOME | End: 2022-08-28
Attending: FAMILY MEDICINE | Admitting: FAMILY MEDICINE
Payer: COMMERCIAL

## 2022-08-28 VITALS
DIASTOLIC BLOOD PRESSURE: 67 MMHG | OXYGEN SATURATION: 96 % | HEART RATE: 84 BPM | BODY MASS INDEX: 21.47 KG/M2 | TEMPERATURE: 96.8 F | HEIGHT: 70 IN | WEIGHT: 150 LBS | RESPIRATION RATE: 16 BRPM | SYSTOLIC BLOOD PRESSURE: 121 MMHG

## 2022-08-28 DIAGNOSIS — S81.801A OPEN WOUND OF LOWER LIMB, RIGHT, INITIAL ENCOUNTER: ICD-10-CM

## 2022-08-28 PROCEDURE — 99283 EMERGENCY DEPT VISIT LOW MDM: CPT | Performed by: FAMILY MEDICINE

## 2022-08-28 PROCEDURE — 90715 TDAP VACCINE 7 YRS/> IM: CPT | Performed by: FAMILY MEDICINE

## 2022-08-28 PROCEDURE — 90471 IMMUNIZATION ADMIN: CPT | Performed by: FAMILY MEDICINE

## 2022-08-28 PROCEDURE — 250N000011 HC RX IP 250 OP 636: Performed by: FAMILY MEDICINE

## 2022-08-28 PROCEDURE — 99283 EMERGENCY DEPT VISIT LOW MDM: CPT | Mod: 25 | Performed by: FAMILY MEDICINE

## 2022-08-28 RX ORDER — MUPIROCIN 20 MG/G
OINTMENT TOPICAL
Qty: 22 G | Refills: 0 | Status: ON HOLD | OUTPATIENT
Start: 2022-08-28 | End: 2023-10-20

## 2022-08-28 RX ADMIN — TETANUS TOXOID, REDUCED DIPHTHERIA TOXOID AND ACELLULAR PERTUSSIS VACCINE, ADSORBED 0.5 ML: 5; 2.5; 8; 8; 2.5 SUSPENSION INTRAMUSCULAR at 15:53

## 2022-08-28 ASSESSMENT — ENCOUNTER SYMPTOMS
CONSTITUTIONAL NEGATIVE: 1
GASTROINTESTINAL NEGATIVE: 1

## 2022-08-28 NOTE — DISCHARGE INSTRUCTIONS
Stop hydrogen peroxide and stop the triple antibiotic ointment  Apply mupirocin ointment 2 to 3 times daily   Rinse leg and pat dry. Soap is OK, but not needed. Avoid scrubbing.  If redness spreads, then oral antibiotics are needed    Updated tetanus

## 2022-08-28 NOTE — ED PROVIDER NOTES
"  History     Chief Complaint   Patient presents with     Cellulitis     HPI  Sd Obrien is a 22 year old male with type 1 diabetes who presents for a right leg wound. Hit on a bolt sticking out from a hitch 2 weeks ago. It was in the water. Had profuse bleeding. Since then applying hydrogen peroxide and triple antibiotic ointment. Slow increase in redness at the wound site. Keeping covered most of the time. No fever.  Last A1c 9% in March 2022    Allergies:  No Known Allergies    Problem List:    Patient Active Problem List    Diagnosis Date Noted     Diabetic ketoacidosis without coma associated with type 1 diabetes mellitus (H) 01/16/2022     Priority: Medium     SIRS (systemic inflammatory response syndrome) (H) 01/16/2022     Priority: Medium     Hyponatremia 01/16/2022     Priority: Medium     Insulin pump status 03/02/2016     Priority: Medium     Formatting of this note might be different from the original.  Basal insulin replacement for pump failure, 19.5 units (3/19/2021)       Type 1 diabetes mellitus without complication (H) 01/11/2016     Priority: Medium        Past Medical History:    No past medical history on file.    Past Surgical History:    No past surgical history on file.    Family History:    No family history on file.    Social History:  Marital Status:  Single [1]  Social History     Tobacco Use     Smoking status: Passive Smoke Exposure - Never Smoker     Smokeless tobacco: Never Used     Tobacco comment: Quit smoking: dad smokes outside   Substance Use Topics     Alcohol use: Never     Drug use: Never        Medications:    insulin lispro (HUMALOG VIAL) 100 UNIT/ML vial  mupirocin (BACTROBAN) 2 % external ointment  blood glucose (NO BRAND SPECIFIED) test strip  CONTOUR NEXT TEST test strip          Review of Systems   Constitutional: Negative.    Gastrointestinal: Negative.        Physical Exam   BP: 121/67  Pulse: 84  Temp: 96.8  F (36  C)  Resp: 16  Height: 177.8 cm (5' 10\")  Weight: " 68 kg (150 lb)  SpO2: 96 %      Physical Exam  General Appearance: Alert. No acute distress  Psychiatric: Normal affect and mentation  Skin: around 4 cm linear wound on right shin with minimal surrounding erythema, minimal induration. Typical for healing            ED Course                 Procedures             Critical Care time:  none         No results found for this or any previous visit (from the past 24 hour(s)).    Medications   Tdap (tetanus-diphtheria-acell pertussis) (ADACEL) injection 0.5 mL (has no administration in time range)       Assessments & Plan (with Medical Decision Making)     I have reviewed the nursing notes.    I have reviewed the findings, diagnosis, plan and need for follow up with the patient.  Updated Td  Slow increase in redness, which is reassuring. Exam not fitting for cellulitis. Redness most consistent with healing.  He does have risk factors for infection with diabetes type 1, uncontrolled and potential water exposure, but I would expect more rapid spread of redness.   Recommend stop use of hydrogen peroxide. Wash and pat dry  Start mupirocin twice daily. If redness spreads, then should be on oral antibiotics.    New Prescriptions    MUPIROCIN (BACTROBAN) 2 % EXTERNAL OINTMENT    Apply to wound 2-3 times daily until healed       Final diagnoses:   Open wound of lower limb, right, initial encounter       8/28/2022   [facility]     Bill Ennis MD  08/28/22 2082

## 2022-08-28 NOTE — ED TRIAGE NOTES
"Pt presents to ED from home with mother for c/o left shin wound. Pt states 2 weeks ago he cut his leg on a trailer hitch, since laceration has become reddened with purulent drainage. DM type 1, pt afebrile, denies N/V. Unsure of last tetanus. Redness marked.   /67   Pulse 84   Temp 96.8  F (36  C) (Tympanic)   Resp 16   Ht 1.778 m (5' 10\")   Wt 68 kg (150 lb)   SpO2 96%   BMI 21.52 kg/m         Triage Assessment     Row Name 08/28/22 1508       Triage Assessment (Adult)    Airway WDL WDL       Respiratory WDL    Respiratory WDL WDL       Skin Circulation/Temperature WDL    Skin Circulation/Temperature WDL X  left shin laceration, reddened        Cardiac WDL    Cardiac WDL WDL       Peripheral/Neurovascular WDL    Peripheral Neurovascular WDL WDL       Cognitive/Neuro/Behavioral WDL    Cognitive/Neuro/Behavioral WDL WDL              "

## 2022-09-17 ENCOUNTER — HEALTH MAINTENANCE LETTER (OUTPATIENT)
Age: 22
End: 2022-09-17

## 2022-10-03 ENCOUNTER — HOSPITAL ENCOUNTER (EMERGENCY)
Facility: OTHER | Age: 22
Discharge: LEFT BEFORE DISCHARGE | End: 2022-10-03
Payer: COMMERCIAL

## 2022-10-03 VITALS
DIASTOLIC BLOOD PRESSURE: 77 MMHG | OXYGEN SATURATION: 97 % | BODY MASS INDEX: 22.24 KG/M2 | TEMPERATURE: 98.8 F | HEART RATE: 98 BPM | WEIGHT: 155 LBS | RESPIRATION RATE: 16 BRPM | SYSTOLIC BLOOD PRESSURE: 141 MMHG

## 2022-10-04 NOTE — ED TRIAGE NOTES
Pt arrives with c/o falling while hiking and hitting back of head on some rocks. Pt denies LOC, denies blood thinners, denies dizziness or lightheadedness. Pt appears alert, oriented, calm, cooperative, and in no acute distress.      Triage Assessment     Row Name 10/03/22 1948       Triage Assessment (Adult)    Airway WDL WDL       Respiratory WDL    Respiratory WDL WDL       Skin Circulation/Temperature WDL    Skin Circulation/Temperature WDL X  abrasion to back of head       Cardiac WDL    Cardiac WDL WDL       Peripheral/Neurovascular WDL    Peripheral Neurovascular WDL WDL       Cognitive/Neuro/Behavioral WDL    Cognitive/Neuro/Behavioral WDL WDL

## 2023-01-23 ENCOUNTER — HEALTH MAINTENANCE LETTER (OUTPATIENT)
Age: 23
End: 2023-01-23

## 2023-05-06 ENCOUNTER — HEALTH MAINTENANCE LETTER (OUTPATIENT)
Age: 23
End: 2023-05-06

## 2023-10-07 ENCOUNTER — HEALTH MAINTENANCE LETTER (OUTPATIENT)
Age: 23
End: 2023-10-07

## 2023-10-19 ENCOUNTER — APPOINTMENT (OUTPATIENT)
Dept: GENERAL RADIOLOGY | Facility: OTHER | Age: 23
DRG: 639 | End: 2023-10-19
Attending: EMERGENCY MEDICINE
Payer: COMMERCIAL

## 2023-10-19 ENCOUNTER — HOSPITAL ENCOUNTER (INPATIENT)
Facility: OTHER | Age: 23
LOS: 1 days | Discharge: HOME | DRG: 639 | End: 2023-10-20
Attending: EMERGENCY MEDICINE | Admitting: INTERNAL MEDICINE
Payer: COMMERCIAL

## 2023-10-19 DIAGNOSIS — Z96.41 INSULIN PUMP STATUS: ICD-10-CM

## 2023-10-19 DIAGNOSIS — E08.10 DIABETIC KETOACIDOSIS WITHOUT COMA ASSOCIATED WITH DIABETES MELLITUS DUE TO UNDERLYING CONDITION (H): ICD-10-CM

## 2023-10-19 DIAGNOSIS — E10.10 TYPE 1 DIABETES MELLITUS WITH KETOACIDOSIS WITHOUT COMA (H): Primary | ICD-10-CM

## 2023-10-19 LAB
ALBUMIN SERPL BCG-MCNC: 5.3 G/DL (ref 3.5–5.2)
ALBUMIN SERPL BCG-MCNC: 5.4 G/DL (ref 3.5–5.2)
ALBUMIN UR-MCNC: 50 MG/DL
ALLEN'S TEST: NO
ALP SERPL-CCNC: 95 U/L (ref 40–129)
ALP SERPL-CCNC: 95 U/L (ref 40–129)
ALT SERPL W P-5'-P-CCNC: 28 U/L (ref 0–70)
ALT SERPL W P-5'-P-CCNC: 29 U/L (ref 0–70)
AMYLASE SERPL-CCNC: 22 U/L (ref 28–100)
ANION GAP SERPL CALCULATED.3IONS-SCNC: 27 MMOL/L (ref 7–15)
ANION GAP SERPL CALCULATED.3IONS-SCNC: 31 MMOL/L (ref 7–15)
APPEARANCE UR: CLEAR
AST SERPL W P-5'-P-CCNC: 33 U/L (ref 0–45)
AST SERPL W P-5'-P-CCNC: 35 U/L (ref 0–45)
B-OH-BUTYR SERPL-SCNC: 7.5 MMOL/L
B-OH-BUTYR SERPL-SCNC: 7.6 MMOL/L
BASE EXCESS BLDA CALC-SCNC: -19.7 MMOL/L (ref -9–1.8)
BASO+EOS+MONOS # BLD AUTO: ABNORMAL 10*3/UL
BASO+EOS+MONOS # BLD AUTO: ABNORMAL 10*3/UL
BASO+EOS+MONOS NFR BLD AUTO: ABNORMAL %
BASO+EOS+MONOS NFR BLD AUTO: ABNORMAL %
BASOPHILS # BLD AUTO: 0.1 10E3/UL (ref 0–0.2)
BASOPHILS # BLD AUTO: 0.1 10E3/UL (ref 0–0.2)
BASOPHILS NFR BLD AUTO: 0 %
BASOPHILS NFR BLD AUTO: 1 %
BILIRUB DIRECT SERPL-MCNC: 0.26 MG/DL (ref 0–0.3)
BILIRUB SERPL-MCNC: 1.4 MG/DL
BILIRUB SERPL-MCNC: 1.4 MG/DL
BILIRUB UR QL STRIP: NEGATIVE
BUN SERPL-MCNC: 22.6 MG/DL (ref 6–20)
BUN SERPL-MCNC: 22.7 MG/DL (ref 6–20)
CALCIUM SERPL-MCNC: 10.4 MG/DL (ref 8.6–10)
CALCIUM SERPL-MCNC: 9.5 MG/DL (ref 8.6–10)
CHLORIDE SERPL-SCNC: 90 MMOL/L (ref 98–107)
CHLORIDE SERPL-SCNC: 94 MMOL/L (ref 98–107)
COLOR UR AUTO: ABNORMAL
CREAT SERPL-MCNC: 0.98 MG/DL (ref 0.67–1.17)
CREAT SERPL-MCNC: 1.01 MG/DL (ref 0.67–1.17)
DEPRECATED HCO3 PLAS-SCNC: 10 MMOL/L (ref 22–29)
DEPRECATED HCO3 PLAS-SCNC: 10 MMOL/L (ref 22–29)
EGFRCR SERPLBLD CKD-EPI 2021: >90 ML/MIN/1.73M2
EGFRCR SERPLBLD CKD-EPI 2021: >90 ML/MIN/1.73M2
EOSINOPHIL # BLD AUTO: 0 10E3/UL (ref 0–0.7)
EOSINOPHIL # BLD AUTO: 0 10E3/UL (ref 0–0.7)
EOSINOPHIL NFR BLD AUTO: 0 %
EOSINOPHIL NFR BLD AUTO: 0 %
ERYTHROCYTE [DISTWIDTH] IN BLOOD BY AUTOMATED COUNT: 11.5 % (ref 10–15)
ERYTHROCYTE [DISTWIDTH] IN BLOOD BY AUTOMATED COUNT: 11.6 % (ref 10–15)
GLUCOSE BLDC GLUCOMTR-MCNC: 217 MG/DL (ref 70–99)
GLUCOSE BLDC GLUCOMTR-MCNC: 230 MG/DL (ref 70–99)
GLUCOSE BLDC GLUCOMTR-MCNC: 251 MG/DL (ref 70–99)
GLUCOSE BLDC GLUCOMTR-MCNC: 283 MG/DL (ref 70–99)
GLUCOSE BLDC GLUCOMTR-MCNC: 356 MG/DL (ref 70–99)
GLUCOSE SERPL-MCNC: 356 MG/DL (ref 70–99)
GLUCOSE SERPL-MCNC: 372 MG/DL (ref 70–99)
GLUCOSE UR STRIP-MCNC: >1000 MG/DL
HBA1C MFR BLD: 11.2 % (ref 4–6.2)
HCO3 BLD-SCNC: 8 MMOL/L (ref 21–28)
HCT VFR BLD AUTO: 45.5 % (ref 40–53)
HCT VFR BLD AUTO: 47.7 % (ref 40–53)
HGB BLD-MCNC: 15.6 G/DL (ref 13.3–17.7)
HGB BLD-MCNC: 16.9 G/DL (ref 13.3–17.7)
HGB UR QL STRIP: ABNORMAL
HOLD SPECIMEN: NORMAL
IMM GRANULOCYTES # BLD: 0.1 10E3/UL
IMM GRANULOCYTES # BLD: 0.2 10E3/UL
IMM GRANULOCYTES NFR BLD: 1 %
IMM GRANULOCYTES NFR BLD: 1 %
INR PPP: 1.16 (ref 0.85–1.15)
KETONES UR STRIP-MCNC: >150 MG/DL
LACTATE SERPL-SCNC: 2.9 MMOL/L (ref 0.7–2)
LEUKOCYTE ESTERASE UR QL STRIP: NEGATIVE
LIPASE SERPL-CCNC: 11 U/L (ref 13–60)
LYMPHOCYTES # BLD AUTO: 1.1 10E3/UL (ref 0.8–5.3)
LYMPHOCYTES # BLD AUTO: 1.3 10E3/UL (ref 0.8–5.3)
LYMPHOCYTES NFR BLD AUTO: 5 %
LYMPHOCYTES NFR BLD AUTO: 6 %
MAGNESIUM SERPL-MCNC: 2 MG/DL (ref 1.7–2.3)
MCH RBC QN AUTO: 30.1 PG (ref 26.5–33)
MCH RBC QN AUTO: 30.7 PG (ref 26.5–33)
MCHC RBC AUTO-ENTMCNC: 34.3 G/DL (ref 31.5–36.5)
MCHC RBC AUTO-ENTMCNC: 35.4 G/DL (ref 31.5–36.5)
MCV RBC AUTO: 87 FL (ref 78–100)
MCV RBC AUTO: 88 FL (ref 78–100)
MONOCYTES # BLD AUTO: 0.7 10E3/UL (ref 0–1.3)
MONOCYTES # BLD AUTO: 1 10E3/UL (ref 0–1.3)
MONOCYTES NFR BLD AUTO: 3 %
MONOCYTES NFR BLD AUTO: 5 %
MUCOUS THREADS #/AREA URNS LPF: PRESENT /LPF
NEUTROPHILS # BLD AUTO: 18.4 10E3/UL (ref 1.6–8.3)
NEUTROPHILS # BLD AUTO: 19.1 10E3/UL (ref 1.6–8.3)
NEUTROPHILS NFR BLD AUTO: 87 %
NEUTROPHILS NFR BLD AUTO: 91 %
NITRATE UR QL: NEGATIVE
NRBC # BLD AUTO: 0 10E3/UL
NRBC # BLD AUTO: 0 10E3/UL
NRBC BLD AUTO-RTO: 0 /100
O2/TOTAL GAS SETTING VFR VENT: 21 %
OXYHGB MFR BLD: 97 % (ref 92–100)
PCO2 BLD: 22 MM HG (ref 35–45)
PH BLD: 7.14 [PH] (ref 7.35–7.45)
PH UR STRIP: 5.5 [PH] (ref 5–9)
PHOSPHATE SERPL-MCNC: 4.9 MG/DL (ref 2.5–4.5)
PLATELET # BLD AUTO: 334 10E3/UL (ref 150–450)
PLATELET # BLD AUTO: 353 10E3/UL (ref 150–450)
PO2 BLD: 111 MM HG (ref 80–105)
POTASSIUM SERPL-SCNC: 4.9 MMOL/L (ref 3.4–5.3)
POTASSIUM SERPL-SCNC: 5 MMOL/L (ref 3.4–5.3)
PROT SERPL-MCNC: 8.7 G/DL (ref 6.4–8.3)
PROT SERPL-MCNC: 8.8 G/DL (ref 6.4–8.3)
RBC # BLD AUTO: 5.19 10E6/UL (ref 4.4–5.9)
RBC # BLD AUTO: 5.51 10E6/UL (ref 4.4–5.9)
RBC URINE: 0 /HPF
SODIUM SERPL-SCNC: 131 MMOL/L (ref 135–145)
SODIUM SERPL-SCNC: 131 MMOL/L (ref 135–145)
SP GR UR STRIP: 1.03 (ref 1–1.03)
UROBILINOGEN UR STRIP-MCNC: NORMAL MG/DL
WBC # BLD AUTO: 20.9 10E3/UL (ref 4–11)
WBC # BLD AUTO: 21.2 10E3/UL (ref 4–11)
WBC URINE: <1 /HPF

## 2023-10-19 PROCEDURE — 82010 KETONE BODYS QUAN: CPT | Performed by: EMERGENCY MEDICINE

## 2023-10-19 PROCEDURE — 82962 GLUCOSE BLOOD TEST: CPT

## 2023-10-19 PROCEDURE — 85610 PROTHROMBIN TIME: CPT | Performed by: EMERGENCY MEDICINE

## 2023-10-19 PROCEDURE — 82248 BILIRUBIN DIRECT: CPT | Performed by: EMERGENCY MEDICINE

## 2023-10-19 PROCEDURE — 83930 ASSAY OF BLOOD OSMOLALITY: CPT | Performed by: INTERNAL MEDICINE

## 2023-10-19 PROCEDURE — 99291 CRITICAL CARE FIRST HOUR: CPT | Mod: 25 | Performed by: EMERGENCY MEDICINE

## 2023-10-19 PROCEDURE — 83605 ASSAY OF LACTIC ACID: CPT | Performed by: EMERGENCY MEDICINE

## 2023-10-19 PROCEDURE — 99292 CRITICAL CARE ADDL 30 MIN: CPT | Performed by: EMERGENCY MEDICINE

## 2023-10-19 PROCEDURE — 80053 COMPREHEN METABOLIC PANEL: CPT | Performed by: EMERGENCY MEDICINE

## 2023-10-19 PROCEDURE — 258N000003 HC RX IP 258 OP 636: Performed by: INTERNAL MEDICINE

## 2023-10-19 PROCEDURE — 71045 X-RAY EXAM CHEST 1 VIEW: CPT | Mod: TC

## 2023-10-19 PROCEDURE — 200N000001 HC R&B ICU

## 2023-10-19 PROCEDURE — 83735 ASSAY OF MAGNESIUM: CPT | Performed by: INTERNAL MEDICINE

## 2023-10-19 PROCEDURE — 96365 THER/PROPH/DIAG IV INF INIT: CPT | Performed by: EMERGENCY MEDICINE

## 2023-10-19 PROCEDURE — 87040 BLOOD CULTURE FOR BACTERIA: CPT | Performed by: INTERNAL MEDICINE

## 2023-10-19 PROCEDURE — 85025 COMPLETE CBC W/AUTO DIFF WBC: CPT | Performed by: INTERNAL MEDICINE

## 2023-10-19 PROCEDURE — 82805 BLOOD GASES W/O2 SATURATION: CPT | Performed by: INTERNAL MEDICINE

## 2023-10-19 PROCEDURE — 36600 WITHDRAWAL OF ARTERIAL BLOOD: CPT | Performed by: INTERNAL MEDICINE

## 2023-10-19 PROCEDURE — 80048 BASIC METABOLIC PNL TOTAL CA: CPT | Performed by: INTERNAL MEDICINE

## 2023-10-19 PROCEDURE — 82150 ASSAY OF AMYLASE: CPT | Performed by: INTERNAL MEDICINE

## 2023-10-19 PROCEDURE — 82010 KETONE BODYS QUAN: CPT | Performed by: INTERNAL MEDICINE

## 2023-10-19 PROCEDURE — 85025 COMPLETE CBC W/AUTO DIFF WBC: CPT | Performed by: EMERGENCY MEDICINE

## 2023-10-19 PROCEDURE — 83690 ASSAY OF LIPASE: CPT | Performed by: EMERGENCY MEDICINE

## 2023-10-19 PROCEDURE — 36415 COLL VENOUS BLD VENIPUNCTURE: CPT | Performed by: EMERGENCY MEDICINE

## 2023-10-19 PROCEDURE — 96366 THER/PROPH/DIAG IV INF ADDON: CPT | Performed by: EMERGENCY MEDICINE

## 2023-10-19 PROCEDURE — 81001 URINALYSIS AUTO W/SCOPE: CPT | Performed by: EMERGENCY MEDICINE

## 2023-10-19 PROCEDURE — 250N000011 HC RX IP 250 OP 636: Mod: JZ | Performed by: EMERGENCY MEDICINE

## 2023-10-19 PROCEDURE — 250N000009 HC RX 250: Performed by: EMERGENCY MEDICINE

## 2023-10-19 PROCEDURE — 84100 ASSAY OF PHOSPHORUS: CPT | Performed by: INTERNAL MEDICINE

## 2023-10-19 PROCEDURE — 96375 TX/PRO/DX INJ NEW DRUG ADDON: CPT | Performed by: EMERGENCY MEDICINE

## 2023-10-19 PROCEDURE — 83036 HEMOGLOBIN GLYCOSYLATED A1C: CPT | Performed by: EMERGENCY MEDICINE

## 2023-10-19 PROCEDURE — 258N000003 HC RX IP 258 OP 636: Performed by: EMERGENCY MEDICINE

## 2023-10-19 PROCEDURE — 36415 COLL VENOUS BLD VENIPUNCTURE: CPT | Performed by: INTERNAL MEDICINE

## 2023-10-19 PROCEDURE — 99285 EMERGENCY DEPT VISIT HI MDM: CPT | Performed by: EMERGENCY MEDICINE

## 2023-10-19 RX ORDER — DEXTROSE MONOHYDRATE 25 G/50ML
25-50 INJECTION, SOLUTION INTRAVENOUS
Status: DISCONTINUED | OUTPATIENT
Start: 2023-10-19 | End: 2023-10-20 | Stop reason: HOSPADM

## 2023-10-19 RX ORDER — ONDANSETRON 2 MG/ML
4 INJECTION INTRAMUSCULAR; INTRAVENOUS ONCE
Status: COMPLETED | OUTPATIENT
Start: 2023-10-19 | End: 2023-10-19

## 2023-10-19 RX ORDER — SODIUM CHLORIDE AND POTASSIUM CHLORIDE 150; 450 MG/100ML; MG/100ML
INJECTION, SOLUTION INTRAVENOUS CONTINUOUS
Status: DISCONTINUED | OUTPATIENT
Start: 2023-10-19 | End: 2023-10-20

## 2023-10-19 RX ORDER — METOCLOPRAMIDE HYDROCHLORIDE 5 MG/ML
10 INJECTION INTRAMUSCULAR; INTRAVENOUS ONCE
Status: COMPLETED | OUTPATIENT
Start: 2023-10-19 | End: 2023-10-19

## 2023-10-19 RX ORDER — DEXTROSE MONOHYDRATE, SODIUM CHLORIDE, AND POTASSIUM CHLORIDE 50; 1.49; 4.5 G/1000ML; G/1000ML; G/1000ML
INJECTION, SOLUTION INTRAVENOUS CONTINUOUS
Status: DISCONTINUED | OUTPATIENT
Start: 2023-10-19 | End: 2023-10-20

## 2023-10-19 RX ORDER — NICOTINE POLACRILEX 4 MG
15-30 LOZENGE BUCCAL
Status: DISCONTINUED | OUTPATIENT
Start: 2023-10-19 | End: 2023-10-20 | Stop reason: HOSPADM

## 2023-10-19 RX ORDER — SODIUM CHLORIDE 9 MG/ML
1000 INJECTION, SOLUTION INTRAVENOUS CONTINUOUS
Status: DISCONTINUED | OUTPATIENT
Start: 2023-10-19 | End: 2023-10-20 | Stop reason: HOSPADM

## 2023-10-19 RX ADMIN — ONDANSETRON 4 MG: 2 INJECTION INTRAMUSCULAR; INTRAVENOUS at 19:36

## 2023-10-19 RX ADMIN — SODIUM CHLORIDE 1000 ML: 9 INJECTION, SOLUTION INTRAVENOUS at 21:53

## 2023-10-19 RX ADMIN — SODIUM CHLORIDE 1000 ML: 9 INJECTION, SOLUTION INTRAVENOUS at 20:41

## 2023-10-19 RX ADMIN — METOCLOPRAMIDE 10 MG: 5 INJECTION, SOLUTION INTRAMUSCULAR; INTRAVENOUS at 21:43

## 2023-10-19 RX ADMIN — SODIUM CHLORIDE 1000 ML: 9 INJECTION, SOLUTION INTRAVENOUS at 19:37

## 2023-10-19 RX ADMIN — POTASSIUM CHLORIDE, DEXTROSE MONOHYDRATE AND SODIUM CHLORIDE: 150; 5; 450 INJECTION, SOLUTION INTRAVENOUS at 22:38

## 2023-10-19 RX ADMIN — INSULIN HUMAN 5 UNITS/HR: 1 INJECTION, SOLUTION INTRAVENOUS at 20:52

## 2023-10-19 ASSESSMENT — ENCOUNTER SYMPTOMS
ENDOCRINE NEGATIVE: 1
NEUROLOGICAL NEGATIVE: 1
NAUSEA: 1
FEVER: 0
VOMITING: 1
EYES NEGATIVE: 1
NECK PAIN: 0
PHOTOPHOBIA: 0
RESPIRATORY NEGATIVE: 1
PSYCHIATRIC NEGATIVE: 1
MUSCULOSKELETAL NEGATIVE: 1
HEMATOLOGIC/LYMPHATIC NEGATIVE: 1
MYALGIAS: 0
ABDOMINAL PAIN: 0
SHORTNESS OF BREATH: 0
NECK STIFFNESS: 0
CONSTITUTIONAL NEGATIVE: 1
ALLERGIC/IMMUNOLOGIC NEGATIVE: 1
CARDIOVASCULAR NEGATIVE: 1

## 2023-10-19 ASSESSMENT — ACTIVITIES OF DAILY LIVING (ADL)
ADLS_ACUITY_SCORE: 35

## 2023-10-19 NOTE — ED TRIAGE NOTES
"Patient here with high blood sugars and vomiting since 1100.  Patient states his last blood sugar check was 300.  Patient also reports ketones in his urine.  Patient has an insulin pump and has been dosing himself to help his blood sugars./75   Pulse 99   Temp 97.6  F (36.4  C) (Tympanic)   Resp 20   Ht 1.778 m (5' 10\")   Wt 61.2 kg (135 lb)   SpO2 100%   BMI 19.37 kg/m         Triage Assessment (Adult)       Row Name 10/19/23 7590          Triage Assessment    Airway WDL WDL        Respiratory WDL    Respiratory WDL WDL        Skin Circulation/Temperature WDL    Skin Circulation/Temperature WDL WDL        Cardiac WDL    Cardiac WDL WDL        Peripheral/Neurovascular WDL    Peripheral Neurovascular WDL WDL        Cognitive/Neuro/Behavioral WDL    Cognitive/Neuro/Behavioral WDL WDL                     "

## 2023-10-20 VITALS
BODY MASS INDEX: 20.5 KG/M2 | TEMPERATURE: 97.6 F | DIASTOLIC BLOOD PRESSURE: 79 MMHG | WEIGHT: 143.2 LBS | SYSTOLIC BLOOD PRESSURE: 117 MMHG | OXYGEN SATURATION: 96 % | RESPIRATION RATE: 18 BRPM | HEART RATE: 86 BPM | HEIGHT: 70 IN

## 2023-10-20 LAB
ANION GAP SERPL CALCULATED.3IONS-SCNC: 12 MMOL/L (ref 7–15)
ANION GAP SERPL CALCULATED.3IONS-SCNC: 14 MMOL/L (ref 7–15)
ANION GAP SERPL CALCULATED.3IONS-SCNC: 17 MMOL/L (ref 7–15)
ANION GAP SERPL CALCULATED.3IONS-SCNC: 19 MMOL/L (ref 7–15)
B-OH-BUTYR SERPL-SCNC: 0.7 MMOL/L
B-OH-BUTYR SERPL-SCNC: 1.9 MMOL/L
B-OH-BUTYR SERPL-SCNC: 3.2 MMOL/L
B-OH-BUTYR SERPL-SCNC: 5.27 MMOL/L
BUN SERPL-MCNC: 15.7 MG/DL (ref 6–20)
BUN SERPL-MCNC: 16 MG/DL (ref 6–20)
BUN SERPL-MCNC: 19.5 MG/DL (ref 6–20)
BUN SERPL-MCNC: 19.7 MG/DL (ref 6–20)
CALCIUM SERPL-MCNC: 8.7 MG/DL (ref 8.6–10)
CALCIUM SERPL-MCNC: 9.1 MG/DL (ref 8.6–10)
CALCIUM SERPL-MCNC: 9.1 MG/DL (ref 8.6–10)
CALCIUM SERPL-MCNC: 9.2 MG/DL (ref 8.6–10)
CHLORIDE SERPL-SCNC: 100 MMOL/L (ref 98–107)
CHLORIDE SERPL-SCNC: 102 MMOL/L (ref 98–107)
CHLORIDE SERPL-SCNC: 103 MMOL/L (ref 98–107)
CHLORIDE SERPL-SCNC: 97 MMOL/L (ref 98–107)
CREAT SERPL-MCNC: 0.85 MG/DL (ref 0.67–1.17)
CREAT SERPL-MCNC: 0.87 MG/DL (ref 0.67–1.17)
CREAT SERPL-MCNC: 0.9 MG/DL (ref 0.67–1.17)
CREAT SERPL-MCNC: 0.96 MG/DL (ref 0.67–1.17)
DEPRECATED HCO3 PLAS-SCNC: 11 MMOL/L (ref 22–29)
DEPRECATED HCO3 PLAS-SCNC: 15 MMOL/L (ref 22–29)
DEPRECATED HCO3 PLAS-SCNC: 15 MMOL/L (ref 22–29)
DEPRECATED HCO3 PLAS-SCNC: 18 MMOL/L (ref 22–29)
EGFRCR SERPLBLD CKD-EPI 2021: >90 ML/MIN/1.73M2
GLUCOSE BLDC GLUCOMTR-MCNC: 112 MG/DL (ref 70–99)
GLUCOSE BLDC GLUCOMTR-MCNC: 126 MG/DL (ref 70–99)
GLUCOSE BLDC GLUCOMTR-MCNC: 155 MG/DL (ref 70–99)
GLUCOSE BLDC GLUCOMTR-MCNC: 160 MG/DL (ref 70–99)
GLUCOSE BLDC GLUCOMTR-MCNC: 172 MG/DL (ref 70–99)
GLUCOSE BLDC GLUCOMTR-MCNC: 172 MG/DL (ref 70–99)
GLUCOSE BLDC GLUCOMTR-MCNC: 183 MG/DL (ref 70–99)
GLUCOSE BLDC GLUCOMTR-MCNC: 205 MG/DL (ref 70–99)
GLUCOSE BLDC GLUCOMTR-MCNC: 216 MG/DL (ref 70–99)
GLUCOSE BLDC GLUCOMTR-MCNC: 217 MG/DL (ref 70–99)
GLUCOSE BLDC GLUCOMTR-MCNC: 224 MG/DL (ref 70–99)
GLUCOSE BLDC GLUCOMTR-MCNC: 255 MG/DL (ref 70–99)
GLUCOSE BLDC GLUCOMTR-MCNC: 270 MG/DL (ref 70–99)
GLUCOSE BLDC GLUCOMTR-MCNC: 271 MG/DL (ref 70–99)
GLUCOSE BLDC GLUCOMTR-MCNC: 98 MG/DL (ref 70–99)
GLUCOSE SERPL-MCNC: 132 MG/DL (ref 70–99)
GLUCOSE SERPL-MCNC: 171 MG/DL (ref 70–99)
GLUCOSE SERPL-MCNC: 230 MG/DL (ref 70–99)
GLUCOSE SERPL-MCNC: 274 MG/DL (ref 70–99)
HOLD SPECIMEN: NORMAL
LACTATE SERPL-SCNC: 1 MMOL/L (ref 0.7–2)
OSMOLALITY SERPL: 311 MMOL/KG (ref 275–295)
POTASSIUM SERPL-SCNC: 4.3 MMOL/L (ref 3.4–5.3)
POTASSIUM SERPL-SCNC: 4.5 MMOL/L (ref 3.4–5.3)
POTASSIUM SERPL-SCNC: 4.7 MMOL/L (ref 3.4–5.3)
POTASSIUM SERPL-SCNC: 4.9 MMOL/L (ref 3.4–5.3)
SODIUM SERPL-SCNC: 129 MMOL/L (ref 135–145)
SODIUM SERPL-SCNC: 130 MMOL/L (ref 135–145)
SODIUM SERPL-SCNC: 131 MMOL/L (ref 135–145)
SODIUM SERPL-SCNC: 133 MMOL/L (ref 135–145)

## 2023-10-20 PROCEDURE — 250N000012 HC RX MED GY IP 250 OP 636 PS 637: Performed by: INTERNAL MEDICINE

## 2023-10-20 PROCEDURE — 258N000003 HC RX IP 258 OP 636: Performed by: INTERNAL MEDICINE

## 2023-10-20 PROCEDURE — 36415 COLL VENOUS BLD VENIPUNCTURE: CPT | Performed by: INTERNAL MEDICINE

## 2023-10-20 PROCEDURE — 82010 KETONE BODYS QUAN: CPT | Performed by: INTERNAL MEDICINE

## 2023-10-20 PROCEDURE — 83605 ASSAY OF LACTIC ACID: CPT | Performed by: EMERGENCY MEDICINE

## 2023-10-20 PROCEDURE — 80048 BASIC METABOLIC PNL TOTAL CA: CPT | Performed by: INTERNAL MEDICINE

## 2023-10-20 PROCEDURE — 99207 PR NO CHARGE LOS: CPT | Performed by: INTERNAL MEDICINE

## 2023-10-20 RX ORDER — NICOTINE POLACRILEX 4 MG
15-30 LOZENGE BUCCAL
Status: DISCONTINUED | OUTPATIENT
Start: 2023-10-20 | End: 2023-10-20

## 2023-10-20 RX ORDER — DEXTROSE MONOHYDRATE 25 G/50ML
25-50 INJECTION, SOLUTION INTRAVENOUS
Status: DISCONTINUED | OUTPATIENT
Start: 2023-10-20 | End: 2023-10-20

## 2023-10-20 RX ORDER — SODIUM CHLORIDE 9 MG/ML
INJECTION, SOLUTION INTRAVENOUS CONTINUOUS
Status: DISCONTINUED | OUTPATIENT
Start: 2023-10-20 | End: 2023-10-20 | Stop reason: HOSPADM

## 2023-10-20 RX ORDER — INSULIN LISPRO 100 [IU]/ML
INJECTION, SOLUTION INTRAVENOUS; SUBCUTANEOUS
Qty: 10 ML | Refills: 0 | Status: SHIPPED | OUTPATIENT
Start: 2023-10-20

## 2023-10-20 RX ADMIN — INSULIN GLARGINE 20 UNITS: 100 INJECTION, SOLUTION SUBCUTANEOUS at 12:45

## 2023-10-20 RX ADMIN — INSULIN ASPART 3 UNITS: 100 INJECTION, SOLUTION INTRAVENOUS; SUBCUTANEOUS at 11:21

## 2023-10-20 RX ADMIN — POTASSIUM CHLORIDE, DEXTROSE MONOHYDRATE AND SODIUM CHLORIDE: 150; 5; 450 INJECTION, SOLUTION INTRAVENOUS at 06:37

## 2023-10-20 ASSESSMENT — ACTIVITIES OF DAILY LIVING (ADL)
ADLS_ACUITY_SCORE: 20

## 2023-10-20 NOTE — PROGRESS NOTES
Notified MD of blood sugar elevation after pts pump failure and Novalog pen administration. New orders paced.

## 2023-10-20 NOTE — PROGRESS NOTES
Interdisciplinary Discharge Planning Note    Anticipated Discharge Date: 10/20    Anticipated Discharge Location: Home    Clinical Needs Before Discharge:   Medical stability    Treatment Needs After Discharge:  None identified    Potential Barriers to Discharge: None identified at this time    EVE Schulz  10/20/2023,  1:36 PM

## 2023-10-20 NOTE — PLAN OF CARE
"  Problem: Adult Inpatient Plan of Care  Goal: Plan of Care Review  Description: The Plan of Care Review/Shift note should be completed every shift.  The Outcome Evaluation is a brief statement about your assessment that the patient is improving, declining, or no change.  This information will be displayed automatically on your shift  note.  Outcome: Progressing  Goal: Patient-Specific Goal (Individualized)  Description: You can add care plan individualizations to a care plan. Examples of Individualization might be:  \"Parent requests to be called daily at 9am for status\", \"I have a hard time hearing out of my right ear\", or \"Do not touch me to wake me up as it startles  me\".  Outcome: Progressing  Goal: Absence of Hospital-Acquired Illness or Injury  Outcome: Progressing  Intervention: Prevent Skin Injury  Recent Flowsheet Documentation  Taken 10/19/2023 2300 by Darrel Dukes, RN  Body Position: position changed independently  Goal: Optimal Comfort and Wellbeing  Outcome: Progressing  Intervention: Provide Person-Centered Care  Recent Flowsheet Documentation  Taken 10/19/2023 2300 by aDrrel Dukes, RN  Trust Relationship/Rapport: care explained  Goal: Readiness for Transition of Care  Outcome: Progressing  Intervention: Mutually Develop Transition Plan  Recent Flowsheet Documentation  Taken 10/20/2023 0000 by Darrel Dukes, RN  Equipment Currently Used at Home: none   Goal Outcome Evaluation:                        "

## 2023-10-20 NOTE — ED PROVIDER NOTES
History     Chief Complaint   Patient presents with    Hyperglycemia    Fatigue    Nausea & Vomiting     HPI  Sd Obrien is a 23 year old male who presents today with complaints of generalized fatigue, nausea and vomiting that became severe today but has been present the last few days.  Patient is type I diabetic with a Dexcom and insulin pump.  States that he removed his Dexcom sensor and did not replace it.  For his pump he was simply giving him enough insulin to cover his carbs.  Patient has otherwise been at baseline state of health.  No additional complaints.    Allergies:  No Known Allergies    Problem List:    Patient Active Problem List    Diagnosis Date Noted    Diabetic ketoacidosis without coma associated with type 1 diabetes mellitus (H) 01/16/2022     Priority: Medium    SIRS (systemic inflammatory response syndrome) (H) 01/16/2022     Priority: Medium    Hyponatremia 01/16/2022     Priority: Medium    Insulin pump status 03/02/2016     Priority: Medium     Formatting of this note might be different from the original.  Basal insulin replacement for pump failure, 19.5 units (3/19/2021)      Type 1 diabetes mellitus without complication (H) 01/11/2016     Priority: Medium        Past Medical History:    No past medical history on file.    Past Surgical History:    No past surgical history on file.    Family History:    No family history on file.    Social History:  Marital Status:  Single [1]  Social History     Tobacco Use    Smoking status: Passive Smoke Exposure - Never Smoker    Smokeless tobacco: Never    Tobacco comments:     Quit smoking: dad smokes outside   Substance Use Topics    Alcohol use: Never    Drug use: Never        Medications:    blood glucose (NO BRAND SPECIFIED) test strip  CONTOUR NEXT TEST test strip  insulin lispro (HUMALOG VIAL) 100 UNIT/ML vial  mupirocin (BACTROBAN) 2 % external ointment          Review of Systems   Constitutional: Negative.  Negative for fever.   HENT:  "Negative.     Eyes: Negative.  Negative for photophobia.   Respiratory: Negative.  Negative for shortness of breath.    Cardiovascular: Negative.    Gastrointestinal:  Positive for nausea and vomiting. Negative for abdominal pain.   Endocrine: Negative.    Genitourinary: Negative.    Musculoskeletal: Negative.  Negative for myalgias, neck pain and neck stiffness.   Skin: Negative.    Allergic/Immunologic: Negative.    Neurological: Negative.    Hematological: Negative.    Psychiatric/Behavioral: Negative.         Physical Exam   BP: 125/75  Pulse: 99  Temp: 97.6  F (36.4  C)  Resp: 20  Height: 177.8 cm (5' 10\")  Weight: 61.2 kg (135 lb)  SpO2: 100 %      Physical Exam  Constitutional:       General: He is not in acute distress.     Appearance: Normal appearance. He is normal weight. He is not toxic-appearing.   HENT:      Head: Normocephalic and atraumatic.   Cardiovascular:      Rate and Rhythm: Normal rate and regular rhythm.   Pulmonary:      Effort: Pulmonary effort is normal.   Abdominal:      General: Abdomen is flat. There is no distension.      Palpations: Abdomen is soft.      Tenderness: There is no abdominal tenderness. There is no right CVA tenderness or left CVA tenderness.   Musculoskeletal:         General: Normal range of motion.      Cervical back: Normal range of motion and neck supple.   Skin:     General: Skin is warm.      Capillary Refill: Capillary refill takes less than 2 seconds.   Neurological:      General: No focal deficit present.      Mental Status: He is alert.         ED Course              ED Course as of 10/20/23 0232   u Oct 19, 2023   2121 Dr. Yoder contacted.  He agrees to admit   2127 Patient notified of admission.  Answered family's questions.  Patient denying headache or any new or worsening symptoms     Procedures                Results for orders placed or performed during the hospital encounter of 10/19/23 (from the past 24 hour(s))   [place] Draw    Narrative    The " following orders were created for panel order [place] Draw.  Procedure                               Abnormality         Status                     ---------                               -----------         ------                     Extra Blue Top Tube[[id number]]                              Final result               Extra Red Top Tube[[id number]]                               Final result               Extra Green Top (Lithium...[[id number]]                      Final result               Extra Purple Top Tube[[id number]]                            Final result               Extra Green Top (Lithium...[[id number]]                      Final result                 Please view results for these tests on the individual orders.   Extra Blue Top Tube   Result Value Ref Range    Hold Specimen x    Extra Red Top Tube   Result Value Ref Range    Hold Specimen x    Extra Green Top (Lithium Heparin) Tube   Result Value Ref Range    Hold Specimen x    Extra Purple Top Tube   Result Value Ref Range    Hold Specimen x    Extra Green Top (Lithium Heparin) ON ICE   Result Value Ref Range    Hold Specimen x    Comprehensive metabolic panel   Result Value Ref Range    Sodium 131 (L) 135 - 145 mmol/L    Potassium 4.9 3.4 - 5.3 mmol/L    Carbon Dioxide (CO2) 10 (LL) 22 - 29 mmol/L    Anion Gap 31 (H) 7 - 15 mmol/L    Urea Nitrogen 22.7 (H) 6.0 - 20.0 mg/dL    Creatinine 1.01 0.67 - 1.17 mg/dL    GFR Estimate >90 >60 mL/min/1.73m2    Calcium 10.4 (H) 8.6 - 10.0 mg/dL    Chloride 90 (L) 98 - 107 mmol/L    Glucose 372 (H) 70 - 99 mg/dL    Alkaline Phosphatase 95 40 - 129 U/L    AST 33 0 - 45 U/L    ALT 28 0 - 70 U/L    Protein Total 8.8 (H) 6.4 - 8.3 g/dL    Albumin 5.3 (H) 3.5 - 5.2 g/dL    Bilirubin Total 1.4 (H) <=1.2 mg/dL   CBC with platelets differential    Narrative    The following orders were created for panel order CBC with platelets differential.  Procedure                               Abnormality         Status                      ---------                               -----------         ------                     CBC with platelets and d...[[id number]]  Abnormal            Final result                 Please view results for these tests on the individual orders.   INR   Result Value Ref Range    INR 1.16 (H) 0.85 - 1.15   Lipase   Result Value Ref Range    Lipase 11 (L) 13 - 60 U/L   Ketone Beta-Hydroxybutyrate Quantitative   Result Value Ref Range    Ketone (Beta-Hydroxybutyrate) Quantitative 7.50 (HH) <=0.30 mmol/L   CBC with platelets and differential   Result Value Ref Range    WBC Count 20.9 (H) 4.0 - 11.0 10e3/uL    RBC Count 5.51 4.40 - 5.90 10e6/uL    Hemoglobin 16.9 13.3 - 17.7 g/dL    Hematocrit 47.7 40.0 - 53.0 %    MCV 87 78 - 100 fL    MCH 30.7 26.5 - 33.0 pg    MCHC 35.4 31.5 - 36.5 g/dL    RDW 11.5 10.0 - 15.0 %    Platelet Count 353 150 - 450 10e3/uL    % Neutrophils 87 %    % Lymphocytes 6 %    % Monocytes 5 %    Mids % (Monos, Eos, Basos)      % Eosinophils 0 %    % Basophils 1 %    % Immature Granulocytes 1 %    NRBCs per 100 WBC 0 <1 /100    Absolute Neutrophils 18.4 (H) 1.6 - 8.3 10e3/uL    Absolute Lymphocytes 1.3 0.8 - 5.3 10e3/uL    Absolute Monocytes 1.0 0.0 - 1.3 10e3/uL    Mids Abs (Monos, Eos, Basos)      Absolute Eosinophils 0.0 0.0 - 0.7 10e3/uL    Absolute Basophils 0.1 0.0 - 0.2 10e3/uL    Absolute Immature Granulocytes 0.1 <=0.4 10e3/uL    Absolute NRBCs 0.0 10e3/uL   Hemoglobin A1c   Result Value Ref Range    Hemoglobin A1C 11.2 (H) 4.0 - 6.2 %   Phosphorus   Result Value Ref Range    Phosphorus 4.9 (H) 2.5 - 4.5 mg/dL   Magnesium   Result Value Ref Range    Magnesium 2.0 1.7 - 2.3 mg/dL   Hepatic panel   Result Value Ref Range    Protein Total 8.7 (H) 6.4 - 8.3 g/dL    Albumin 5.4 (H) 3.5 - 5.2 g/dL    Bilirubin Total 1.4 (H) <=1.2 mg/dL    Alkaline Phosphatase 95 40 - 129 U/L    AST 35 0 - 45 U/L    ALT 29 0 - 70 U/L    Bilirubin Direct 0.26 0.00 - 0.30 mg/dL   Amylase   Result Value Ref Range     Amylase 22 (L) 28 - 100 U/L   Glucose by meter   Result Value Ref Range    GLUCOSE BY METER POCT 356 (H) 70 - 99 mg/dL   UA with Microscopic reflex to Culture    Specimen: Urine, Clean Catch   Result Value Ref Range    Color Urine Light Yellow Colorless, Straw, Light Yellow, Yellow    Appearance Urine Clear Clear    Glucose Urine >1000 (A) Negative mg/dL    Bilirubin Urine Negative Negative    Ketones Urine >150 (A) Negative mg/dL    Specific Gravity Urine 1.032 (H) 1.000 - 1.030    Blood Urine Moderate (A) Negative    pH Urine 5.5 5.0 - 9.0    Protein Albumin Urine 50 (A) Negative mg/dL    Urobilinogen Urine Normal Normal, 2.0 mg/dL    Nitrite Urine Negative Negative    Leukocyte Esterase Urine Negative Negative    Mucus Urine Present (A) None Seen /LPF    RBC Urine 0 <=2 /HPF    WBC Urine <1 <=5 /HPF    Narrative    Urine Culture not indicated   Basic metabolic panel   Result Value Ref Range    Sodium 131 (L) 135 - 145 mmol/L    Potassium 5.0 3.4 - 5.3 mmol/L    Chloride 94 (L) 98 - 107 mmol/L    Carbon Dioxide (CO2) 10 (LL) 22 - 29 mmol/L    Anion Gap 27 (H) 7 - 15 mmol/L    Urea Nitrogen 22.6 (H) 6.0 - 20.0 mg/dL    Creatinine 0.98 0.67 - 1.17 mg/dL    GFR Estimate >90 >60 mL/min/1.73m2    Calcium 9.5 8.6 - 10.0 mg/dL    Glucose 356 (H) 70 - 99 mg/dL   Lactic acid whole blood   Result Value Ref Range    Lactic Acid 2.9 (H) 0.7 - 2.0 mmol/L   XR Chest Port 1 View    Narrative    PROCEDURE INFORMATION:   Exam: XR Chest   Exam date and time: 10/19/2023 7:56 PM   Age: 23 years old   Clinical indication: Other: 23-year-old type i diabetic with complaints of   vomiting. Rule out pneumonia     TECHNIQUE:   Imaging protocol: Radiologic exam of the chest.   Views: 1 view.     COMPARISON:   CR XR CHEST 2 VW 1/15/2022 11:18 PM     FINDINGS:   Lungs: Unremarkable. No consolidation.   Pleural spaces: Unremarkable. No pleural effusion. No pneumothorax.   Heart/Mediastinum: Unremarkable. No cardiomegaly.   Bones/joints:  Unremarkable.       Impression    IMPRESSION:   No radiographic evidence of active cardiopulmonary disease.    Current examination is unchanged from prior examination.    THIS DOCUMENT HAS BEEN ELECTRONICALLY SIGNED BY CANDACE VILLEGAS MD   Glucose by meter   Result Value Ref Range    GLUCOSE BY METER POCT 283 (H) 70 - 99 mg/dL   Blood gas arterial and oxyhgb   Result Value Ref Range    pH Arterial 7.14 (LL) 7.35 - 7.45    pCO2 Arterial 22 (L) 35 - 45 mm Hg    pO2 Arterial 111 (H) 80 - 105 mm Hg    Bicarbonate Arterial 8 (LL) 21 - 28 mmol/L    Oxyhemoglobin Arterial 97 92 - 100 %    Base Excess/Deficit -19.7 (L) -9.0 - 1.8 mmol/L    FIO2 21     Danny's Test No    CBC with platelets differential    Narrative    The following orders were created for panel order CBC with platelets differential.  Procedure                               Abnormality         Status                     ---------                               -----------         ------                     CBC with platelets and d...[[id number]]  Abnormal            Final result                 Please view results for these tests on the individual orders.   CBC with platelets and differential   Result Value Ref Range    WBC Count 21.2 (H) 4.0 - 11.0 10e3/uL    RBC Count 5.19 4.40 - 5.90 10e6/uL    Hemoglobin 15.6 13.3 - 17.7 g/dL    Hematocrit 45.5 40.0 - 53.0 %    MCV 88 78 - 100 fL    MCH 30.1 26.5 - 33.0 pg    MCHC 34.3 31.5 - 36.5 g/dL    RDW 11.6 10.0 - 15.0 %    Platelet Count 334 150 - 450 10e3/uL    % Neutrophils 91 %    % Lymphocytes 5 %    % Monocytes 3 %    Mids % (Monos, Eos, Basos)      % Eosinophils 0 %    % Basophils 0 %    % Immature Granulocytes 1 %    Absolute Neutrophils 19.1 (H) 1.6 - 8.3 10e3/uL    Absolute Lymphocytes 1.1 0.8 - 5.3 10e3/uL    Absolute Monocytes 0.7 0.0 - 1.3 10e3/uL    Mids Abs (Monos, Eos, Basos)      Absolute Eosinophils 0.0 0.0 - 0.7 10e3/uL    Absolute Basophils 0.1 0.0 - 0.2 10e3/uL    Absolute Immature  Granulocytes 0.2 <=0.4 10e3/uL    Absolute NRBCs 0.0 10e3/uL   Extra Tube    Narrative    The following orders were created for panel order Extra Tube.  Procedure                               Abnormality         Status                     ---------                               -----------         ------                     Extra Serum Separator Tu...[[id number]]                      Final result                 Please view results for these tests on the individual orders.   Extra Serum Separator Tube (SST)   Result Value Ref Range    Hold Specimen x    Ketone Beta-Hydroxybutyrate Quantitative   Result Value Ref Range    Ketone (Beta-Hydroxybutyrate) Quantitative 7.60 (HH) <=0.30 mmol/L   Glucose by meter   Result Value Ref Range    GLUCOSE BY METER POCT 251 (H) 70 - 99 mg/dL   Glucose by meter   Result Value Ref Range    GLUCOSE BY METER POCT 217 (H) 70 - 99 mg/dL   Glucose by meter   Result Value Ref Range    GLUCOSE BY METER POCT 230 (H) 70 - 99 mg/dL   Glucose by meter   Result Value Ref Range    GLUCOSE BY METER POCT 217 (H) 70 - 99 mg/dL   Lactic acid whole blood   Result Value Ref Range    Lactic Acid 1.0 0.7 - 2.0 mmol/L   Ketone Beta-Hydroxybutyrate Quantitative   Result Value Ref Range    Ketone (Beta-Hydroxybutyrate) Quantitative 5.27 (HH) <=0.30 mmol/L   Basic metabolic panel   Result Value Ref Range    Sodium 130 (L) 135 - 145 mmol/L    Potassium 4.9 3.4 - 5.3 mmol/L    Chloride 100 98 - 107 mmol/L    Carbon Dioxide (CO2) 11 (L) 22 - 29 mmol/L    Anion Gap 19 (H) 7 - 15 mmol/L    Urea Nitrogen 19.7 6.0 - 20.0 mg/dL    Creatinine 0.85 0.67 - 1.17 mg/dL    GFR Estimate >90 >60 mL/min/1.73m2    Calcium 9.1 8.6 - 10.0 mg/dL    Glucose 230 (H) 70 - 99 mg/dL   Glucose by meter   Result Value Ref Range    GLUCOSE BY METER POCT 205 (H) 70 - 99 mg/dL       Medications   ondansetron (ZOFRAN) injection 4 mg (has no administration in time range)   sodium chloride 0.9% BOLUS 1,000 mL (has no administration in time  range)       Assessments & Plan (with Medical Decision Making)     23-year-old male with type 1 diabetes who has been out of his insulin medications for the past few days.  Patient presented with nausea vomiting and generalized malaise.  Notable findings on lab included elevated white blood cell count, ketonuria, ketonemia, low bicarb and elevated blood sugar.  Symptoms consistent with DKA.  Patient started on insulin drip.  Patient denying any symptoms but given blood cell count chest x-ray drawn which was negative.  He is not febrile.  All findings explained to hospitalist who agrees that patient needs to be admitted and will continue work-up to rule out infection.      New Prescriptions    No medications on file       Final diagnoses:   Diabetic ketoacidosis without coma associated with diabetes mellitus due to underlying condition (H)       10/19/2023   [facility]       Jose Alfredo Grant MD  10/20/23 6611

## 2023-10-20 NOTE — DISCHARGE SUMMARY
[facility]  Hospitalist Discharge Summary      Date of Admission:  10/19/2023  Date of Discharge:  10/20/2023  Discharging Provider: Gagandeep Gonzales MD  Discharge Service: Hospitalist Service    Discharge Diagnoses     DKA  Nausea and vomiting    Clinically Significant Risk Factors          Follow-ups Needed After Discharge   Follow-up Appointments     Follow-up and recommended labs and tests       Follow up with primary care provider, Physician No Ref-Primary, within 7   days for hospital follow- up.  No follow up labs or test are needed.            Unresulted Labs Ordered in the Past 30 Days of this Admission       Date and Time Order Name Status Description    10/19/2023  9:34 PM Blood Culture Arm, Right Preliminary     10/19/2023  9:34 PM Blood Culture Arm, Right Preliminary     10/19/2023  9:34 PM Osmolality In process         These results will be followed up by     Discharge Disposition   Discharged to home  Condition at discharge: Stable    Hospital Course   Patient is a pleasant 23-year-old gentleman with a history of diabetes mellitus type 1.  Patient is on insulin pump.  Apparently for about some time, patient ran out of his insulin and was not monitoring his blood sugar.  Patient developed nausea and vomiting  and subsequently came in with DKA.  Patient was stabilized and admitted into the ICU.  Treated.  DKA protocol.  Good response.  Transitioned to subcu insulin and continues to do well.  Nausea and vomiting has completely resolved and patient is eating.  Patient has been given a prescription for his insulin at the moment and advised to follow-up with his endocrinologist to make sure that he has a constant supply.    Consultations This Hospital Stay   None    Code Status   Full Code    Time Spent on this Encounter   I, Gagandeep Gonzales MD, personally saw the patient today and spent less than or equal to 30 minutes discharging this patient.       [facility]  [address]  [place] [address]  Phone: [phone]  Fax: [phone]  ______________________________________________________________________    Physical Exam   Vital Signs: Temp: 97.6  F (36.4  C) Temp src: Tympanic BP: 117/79 Pulse: 86   Resp: 18 SpO2: 96 % O2 Device: None (Room air)    Weight: 143 lbs 3.2 oz         Primary Care Physician   Physician No Ref-Primary    Discharge Orders      Reason for your hospital stay    DKA     Follow-up and recommended labs and tests     Follow up with primary care provider, Physician No Ref-Primary, within 7 days for hospital follow- up.  No follow up labs or test are needed.     Activity    Your activity upon discharge: activity as tolerated     Full Code     Diet    Follow this diet upon discharge: Orders Placed This Encounter      Consistent Carbohydrate Diet Moderate Consistent Carb (60 g CHO per Meal) Diet       Significant Results and Procedures       Discharge Medications   Current Discharge Medication List        START taking these medications    Details   insulin lispro (HUMALOG VIAL) 100 UNIT/ML vial For use in insulin pump  Qty: 10 mL, Refills: 0    Comments: Under [facility] program  Associated Diagnoses: Type 1 diabetes mellitus with ketoacidosis without coma (H)           CONTINUE these medications which have NOT CHANGED    Details   !! blood glucose (NO BRAND SPECIFIED) test strip 6 times a day, patient needs these to use with his insulin pump, cannot use another test strip, start PA if necessary.      !! CONTOUR NEXT TEST test strip TESTING 6 TIMES DAILY      glucagon 1 MG SOLR injection Inject 1 mg into the muscle once      insulin lispro (HUMALOG VIAL) 100 UNIT/ML vial For use in insulin pump; BR 12A 0.85, 6A 0.80, 11A 0.75,  4P 0.8 10P 0.90  CR: 12A 10 SF 35, TDD - 60 units       !! - Potential duplicate medications found. Please discuss with provider.        Allergies   No Known Allergies

## 2023-10-20 NOTE — PHARMACY-ADMISSION MEDICATION HISTORY
Pharmacy Intern Admission Medication History    Admission medication history is complete. The information provided in this note is only as accurate as the sources available at the time of the update.    Information Source(s): Patient and CareEverywhere/SureScripts via in-person    Pertinent Information: Patient is admitted for DKA. Is on insulin lispro via insulin pump. Patient gets approx 27 units continuously and 5 units per bolus. About 40 units everyday. Pump is running while inpatient. Patient is having difficulty reaching endocrinology in [place] for humalog refills. Left Dear  note for a 1 month supply on discharge.     Changes made to PTA medication list:  Added: Glucagon - Per surescripts and patient   Deleted: mupirocin - temporary use for gash in leg per patient  Changed: None     Medication Affordability: No problems w/ affordability        Allergies reviewed with patient: yes    Medication History Completed By: Albert Davis [facility] 10/20/2023 9:55 AM    PTA Med List   Medication Sig Last Dose    blood glucose (NO BRAND SPECIFIED) test strip 6 times a day, patient needs these to use with his insulin pump, cannot use another test strip, start PA if necessary.     CONTOUR NEXT TEST test strip TESTING 6 TIMES DAILY     glucagon 1 MG SOLR injection Inject 1 mg into the muscle once More than a month    insulin lispro (HUMALOG VIAL) 100 UNIT/ML vial For use in insulin pump; BR 12A 0.85, 6A 0.80, 11A 0.75,  4P 0.8 10P 0.90  CR: 12A 10 SF 35, TDD - 60 units 10/20/2023

## 2023-10-20 NOTE — PLAN OF CARE
"  Problem: Adult Inpatient Plan of Care  Goal: Plan of Care Review  Description: The Plan of Care Review/Shift note should be completed every shift.  The Outcome Evaluation is a brief statement about your assessment that the patient is improving, declining, or no change.  This information will be displayed automatically on your shift  note.  Outcome: Progressing  Goal: Patient-Specific Goal (Individualized)  Description: You can add care plan individualizations to a care plan. Examples of Individualization might be:  \"Parent requests to be called daily at 9am for status\", \"I have a hard time hearing out of my right ear\", or \"Do not touch me to wake me up as it startles  me\".  Outcome: Progressing  Goal: Absence of Hospital-Acquired Illness or Injury  Outcome: Progressing  Intervention: Prevent Skin Injury  Recent Flowsheet Documentation  Taken 10/19/2023 2300 by Darrel Dukes, RN  Body Position: position changed independently  Goal: Optimal Comfort and Wellbeing  Outcome: Progressing  Intervention: Provide Person-Centered Care  Recent Flowsheet Documentation  Taken 10/19/2023 2300 by Darrel Dukes, RN  Trust Relationship/Rapport: care explained  Goal: Readiness for Transition of Care  Outcome: Progressing  Intervention: Mutually Develop Transition Plan  Recent Flowsheet Documentation  Taken 10/20/2023 0000 by Darrel Dukes, RN  Equipment Currently Used at Home: none   Goal Outcome Evaluation:                        "

## 2023-10-20 NOTE — H&P
"CC: Nausea, abdominal discomfort,    HPI: Sd Obrien is an 23 year old male with a history of type 1 diabetes who presents with above complaints.  Patient stated that few weeks ago he is glucose measuring device started to malfunction and he took it off.  He continued to bolus himself with insulin with meals.  Patient also admits that he had trouble feeling prescription for his NPH insulin.  Patient followed by endocrinologist in [place].  Presentation to emergency room patient found to be in DKA.  DKA protocol initiated.  Admitted for further management    No past medical history on file.    Allergies: No Known Allergies    Principal Problem:    Diabetic ketoacidosis without coma associated with diabetes mellitus due to underlying condition (H)    Blood pressure (!) 135/90, pulse 89, temperature 97  F (36.1  C), temperature source Tympanic, resp. rate 16, height 1.778 m (5' 10\"), weight 61.2 kg (135 lb), SpO2 97%.    Review of Systems  ROS:    1.General: no fever, chills, not in distress  2.HEENT: no HA, no blurry vision, no swallow problems, no nasal congestion, no sore throat  3.Pulmonary: no cough, SOB, wheezes  4.CVS: no CP, no palpitations, no MARY, no SOB, no intermittent claudication  5.GI: See above  6.: no renal colic, no hematuria, urinary frequency or urgency  7.Extremities: no edema  8.Neurological: no dizziness, vertigo, double or blurry vision, no no focal weakness, no paresthesia, no swallow or speech problems  9.Musculosceletal: no joint pains, no joint swelling, no back pain  10.Dermatological: no skin rashes, no lesions, no pruritus  11.Hematological: no bleeding, no hx/o clots  12.Endocrinological: no heat/cold intolerance, no hx/o diabetes  13.Psychiatric: no suicidal or homicidal thoughts  Physical Exam  Physical Exam:    Not in distress, pleasant, lucid, cooperative,  Head - atraumatic, eyes - pupils equal, round, reactive to light, extra ocular movement intact, MMM  Neck - supple, thyroid " not enlarged, LN not palpated  Lungs - clear to auscultation, no dullness on percussion  CVS - heart sounds S1, S2, no additional murmurs gallop, regular rate and rhythm  Gastrointestinal-abdomen is soft, non-tender, non-distended, no organomegaly, positive bowel sounds  Extremities no clubbing, cyanosis or edema  Neurological-cranial nerve II-XII grossly intact, no meningeal signs, no cerebellar signs, no sensory deficit  Musculoskeletal - joints, no effusions, ROM preserved  Dermatological - the skin dry, warm, no rashes  Psychiatric-patient is AAO X3, patient has normal affect      Assessment:    Diabetic ketoacidosis  Type 1 diabetes  Leukocytosis    Plan:    Admit to ICU  DKA protocol initiated  Aggressive fluid resuscitation  Close monitoring of kidney function/electrolytes  Fluid replacement/electrolyte replacement as per protocol  Insulin drip  Transition to subcutaneous insulin when anion gap is closed  Suspect leukocytosis is a stress reaction-no signs of infection, would not treat at this point  Patient has established follow-up with endocrinologist elevation      As the provider for the telehealth service, I attest that I introduced myself to the patient, provided my credentials, disclosed by location and determined that based on a review of the patient's chart and discussion with members of the patient's treatment team, telemedicine via real-time, 2 way, and interactive audio and video platform is an appropriate and effective means of providing the service.  The patient and I mutually agree this visit is appropriate for telemedicine.  The virtual encounter was taken place from  [place], [place].  The encounter took approximately 35 minutes.  The nurse was present during the entire time and I was able to move the stethoscope in appropriate directions.  The patient was evaluated at the Hospital         Portions of this note may be dictated using Dragon voice recognition software. Variances in spelling and  vocabulary are possible and unintentional. Not all errors may be caught and/or corrected. Please notify the author if any discrepancies are noted and/or if the meaning of any statement is unclear.          Patient verbally consented for treatment via video visit with patient currently located at [facility] and provider located in [place].         Time Spent: 40 minutes     Austyn Yoder MD  10/20/2023

## 2023-10-20 NOTE — PHARMACY - DISCHARGE MEDICATION RECONCILIATION AND EDUCATION
Pharmacy:  Discharge Counseling and Medication Reconciliation    Sd Obrien  [address] [place] [address]  [phone] (home)   23 year old male  PCP: No Ref-Primary, Physician    Allergies: Patient has no known allergies.    Discharge Counseling:    Pharmacist met with patient (and/or family) today to review the medication portion of the After Visit Summary (with an emphasis on NEW medications) and to address patient's questions/concerns.    Summary of Education: Discussed insulin, patient aware of how to use. Patient denied needing any other diabetic supplies    Discharge Medication Reconciliation:    It has been determined that the patient has an adequate supply of medications available or which can be obtained from the patient's preferred pharmacy, which HE/SHE has confirmed as: [facility]using [facility] program    Thank you for the consult.    Vonda Rivas RPH........October 20, 2023 3:35 PM

## 2023-10-23 ENCOUNTER — PATIENT OUTREACH (OUTPATIENT)
Dept: FAMILY MEDICINE | Facility: OTHER | Age: 23
End: 2023-10-23
Payer: COMMERCIAL

## 2023-10-23 NOTE — TELEPHONE ENCOUNTER
Patient has PCP elsewhere, no follow-up here. No TCM call required per policy.  Evy Plunkett RN on 10/23/2023 at 9:02 AM

## 2023-10-24 LAB
BACTERIA BLD CULT: NO GROWTH
BACTERIA BLD CULT: NO GROWTH

## 2024-02-24 ENCOUNTER — HEALTH MAINTENANCE LETTER (OUTPATIENT)
Age: 24
End: 2024-02-24

## 2024-07-13 ENCOUNTER — HEALTH MAINTENANCE LETTER (OUTPATIENT)
Age: 24
End: 2024-07-13

## 2024-11-30 ENCOUNTER — HEALTH MAINTENANCE LETTER (OUTPATIENT)
Age: 24
End: 2024-11-30

## 2025-03-09 ENCOUNTER — HEALTH MAINTENANCE LETTER (OUTPATIENT)
Age: 25
End: 2025-03-09

## 2025-06-28 ENCOUNTER — HEALTH MAINTENANCE LETTER (OUTPATIENT)
Age: 25
End: 2025-06-28

## 2025-07-19 ENCOUNTER — HEALTH MAINTENANCE LETTER (OUTPATIENT)
Age: 25
End: 2025-07-19

## (undated) RX ORDER — SODIUM CHLORIDE 9 MG/ML
INJECTION, SOLUTION INTRAVENOUS
Status: DISPENSED
Start: 2022-01-15

## (undated) RX ORDER — KETOROLAC TROMETHAMINE 15 MG/ML
INJECTION, SOLUTION INTRAMUSCULAR; INTRAVENOUS
Status: DISPENSED
Start: 2022-01-15

## (undated) RX ORDER — CALCIUM GLUCONATE 94 MG/ML
INJECTION, SOLUTION INTRAVENOUS
Status: DISPENSED
Start: 2022-01-15

## (undated) RX ORDER — ONDANSETRON 2 MG/ML
INJECTION INTRAMUSCULAR; INTRAVENOUS
Status: DISPENSED
Start: 2023-10-19

## (undated) RX ORDER — ONDANSETRON 2 MG/ML
INJECTION INTRAMUSCULAR; INTRAVENOUS
Status: DISPENSED
Start: 2022-01-15

## (undated) RX ORDER — METOCLOPRAMIDE HYDROCHLORIDE 5 MG/ML
INJECTION INTRAMUSCULAR; INTRAVENOUS
Status: DISPENSED
Start: 2023-10-19